# Patient Record
Sex: FEMALE | Race: WHITE | Employment: OTHER | URBAN - METROPOLITAN AREA
[De-identification: names, ages, dates, MRNs, and addresses within clinical notes are randomized per-mention and may not be internally consistent; named-entity substitution may affect disease eponyms.]

---

## 2017-03-10 ENCOUNTER — OFFICE VISIT (OUTPATIENT)
Dept: INTERNAL MEDICINE CLINIC | Age: 62
End: 2017-03-10

## 2017-03-10 VITALS
SYSTOLIC BLOOD PRESSURE: 130 MMHG | HEIGHT: 63 IN | DIASTOLIC BLOOD PRESSURE: 70 MMHG | RESPIRATION RATE: 18 BRPM | TEMPERATURE: 97.8 F | BODY MASS INDEX: 24.59 KG/M2 | WEIGHT: 138.8 LBS | OXYGEN SATURATION: 97 % | HEART RATE: 73 BPM

## 2017-03-10 DIAGNOSIS — K51.30 ULCERATIVE RECTOSIGMOIDITIS WITHOUT COMPLICATION (HCC): ICD-10-CM

## 2017-03-10 DIAGNOSIS — Z13.9 SCREENING: ICD-10-CM

## 2017-03-10 DIAGNOSIS — Z23 NEED FOR ZOSTAVAX ADMINISTRATION: ICD-10-CM

## 2017-03-10 DIAGNOSIS — I10 ESSENTIAL HYPERTENSION: Primary | ICD-10-CM

## 2017-03-10 RX ORDER — LOSARTAN POTASSIUM AND HYDROCHLOROTHIAZIDE 12.5; 5 MG/1; MG/1
1 TABLET ORAL DAILY
Qty: 90 TAB | Refills: 3 | Status: SHIPPED | OUTPATIENT
Start: 2017-03-10 | End: 2018-03-23 | Stop reason: SDUPTHER

## 2017-03-10 NOTE — MR AVS SNAPSHOT
Visit Information Date & Time Provider Department Dept. Phone Encounter #  
 3/10/2017 10:15 AM Rica Kiser MD [facility] [phone] [id number] Follow-up Instructions Return in about 1 year (around 3/10/2018). Follow-up and Disposition History Upcoming Health Maintenance Date Due Hepatitis C Screening 1955 DTaP/Tdap/Td series (1 - Tdap) 6/26/1977 PAP AKA CERVICAL CYTOLOGY 6/26/1977 ZOSTER VACCINE AGE 60> 6/26/2016 INFLUENZA AGE 9 TO ADULT 8/1/2016 BREAST CANCER SCRN MAMMOGRAM 9/21/2018 COLONOSCOPY 11/18/2026 Allergies as of 3/10/2017  Review Complete On: 3/10/2017 By: Doris Walker Severity Noted Reaction Type Reactions Ace Inhibitors  09/08/2010    Cough Current Immunizations  Never Reviewed Name Date Influenza Vaccine 10/22/2014 Not reviewed this visit You Were Diagnosed With   
  
 Codes Comments Essential hypertension    -  Primary ICD-10-CM: I10 
ICD-9-CM: 401.9 Ulcerative rectosigmoiditis without complication ([facility]     JJB-39-OH: K51.30 ICD-9-CM: 556.3 Need for Zostavax administration     ICD-10-CM: S92 ICD-9-CM: V04.89 Screening     ICD-10-CM: Z13.9 ICD-9-CM: V82.9 Vitals BP Pulse Temp Resp Height(growth percentile) Weight(growth percentile) 130/70 (BP 1 Location: Left arm, BP Patient Position: At rest) 73 97.8 °F (36.6 °C) (Oral) 18 5' 3\" (1.6 m) 138 lb 12.8 oz (63 kg) LMP SpO2 BMI OB Status Smoking Status (LMP Unknown) 97% 24.59 kg/m2 Hysterectomy Never Smoker Vitals History BMI and BSA Data Body Mass Index Body Surface Area 24.59 kg/m 2 1.67 m 2 Preferred Pharmacy Pharmacy Name Phone [facility], [address] [phone] Your Updated Medication List  
  
   
This list is accurate as of: 3/10/17 10:51 AM.  Always use your most recent med list.  
  
  
  
  
 CALCIUM PO Take  by mouth. EVAMIST 1.53 mg/spray (1.7%) Spry Generic drug:  Estradiol  
  
 folic acid 1 mg tablet Commonly known as:  Google Take 1 mg by mouth daily. losartan-hydroCHLOROthiazide 50-12.5 mg per tablet Commonly known as:  HYZAAR Take 1 Tab by mouth daily. MULTIVITAMIN PO Take  by mouth.  
  
 sulfaSALAzine 500 mg tablet Commonly known as:  AZULFIDINE Take 500 mg by mouth four (4) times daily. Prescriptions Sent to Pharmacy Refills  
 losartan-hydroCHLOROthiazide (HYZAAR) 50-12.5 mg per tablet 3 Sig: Take 1 Tab by mouth daily. Class: Normal  
 [facility] [address] #: [phone] Route: Oral  
  
We Performed the Following CBC WITH AUTOMATED DIFF [59985 CPT(R)] HEPATITIS C AB [28536 CPT(R)] LIPID PANEL [65339 CPT(R)] METABOLIC PANEL, COMPREHENSIVE [44752 CPT(R)] Follow-up Instructions Return in about 1 year (around 3/10/2018). Introducing [facility]! Dear Harmony Wong: Thank you for requesting a App[employer]net account. Our records indicate that you already have an active [email or web address] account. You can access your account anytime at [email or web address] Did you know that you can access your hospital and ER discharge instructions at any time in [email or web address]? You can also review all of your test results from your hospital stay or ER visit. Additional Information If you have questions, please visit the Frequently Asked Questions section of the [email or web address] website at [email or web address] Remember, [email or web address] is NOT to be used for urgent needs. For medical emergencies, dial 911. Now available from your iPhone and Android! Please provide this summary of care documentation to your next provider. Your primary care clinician is listed as [address] [address] [place] [address] [address]. If you have any questions after today's visit, please call [phone].

## 2017-03-10 NOTE — PROGRESS NOTES
1. Have you been to the ER, urgent care clinic since your last visit? Hospitalized since your last visit? No    2. Have you seen or consulted any other health care providers outside of the [facility] since your last visit? Include any pap smears or colon screening.  No   Chief Complaint   Patient presents with    Medication Refill    Blood Pressure Check     follow up      not fasting

## 2017-03-10 NOTE — PROGRESS NOTES
HISTORY OF PRESENT ILLNESS  Yifan Fox is a 62 y.o. female. HPI  Here for htn. She is well controlled by home readings. Her uc is doing well. She has her c-scope annually. No dysplasia. She is seeing gyn annually. She is active with yard work. She needs labs and vaccines. Past Medical History:   Diagnosis Date    Hypertension     Pyodermic gangrenosum     Ulcerative colitis      Current Outpatient Prescriptions   Medication Sig    losartan-hydroCHLOROthiazide (HYZAAR) 50-12.5 mg per tablet Take 1 Tab by mouth daily.  EVAMIST 1.53 mg/spray (9.9%) Spry     folic acid (FOLVITE) 1 mg tablet Take 1 mg by mouth daily.  MULTIVITAMIN PO Take  by mouth.  CALCIUM PO Take  by mouth.  sulfaSALAzine (AZULFIDINE) 500 mg tablet Take 500 mg by mouth four (4) times daily. No current facility-administered medications for this visit. Review of Systems   All other systems reviewed and are negative. Visit Vitals    /70 (BP 1 Location: Left arm, BP Patient Position: At rest)    Pulse 73    Temp 97.8 °F (36.6 °C) (Oral)    Resp 18    Ht 5' 3\" (1.6 m)    Wt 138 lb 12.8 oz (63 kg)    LMP  (LMP Unknown)    SpO2 97%    BMI 24.59 kg/m2       Physical Exam   Constitutional: She appears well-developed and well-nourished. Cardiovascular: Normal rate, regular rhythm and normal heart sounds. Pulmonary/Chest: Effort normal and breath sounds normal. No respiratory distress. She has no wheezes. She has no rales. Psychiatric: She has a normal mood and affect. Her behavior is normal. Thought content normal.   Nursing note and vitals reviewed. ASSESSMENT and PLAN  Anil Hanson was seen today for medication refill and blood pressure check. Diagnoses and all orders for this visit:    Essential hypertension  -     METABOLIC PANEL, COMPREHENSIVE  -     LIPID PANEL  -     losartan-hydroCHLOROthiazide (HYZAAR) 50-12.5 mg per tablet; Take 1 Tab by mouth daily.   The current medical regimen is effective;  continue present plan and medications. Ulcerative rectosigmoiditis without complication (HCC)  -     CBC WITH AUTOMATED DIFF  The current medical regimen is effective;  continue present plan and medications. Need for Zostavax administration  Rx given.     Screening  -     HEPATITIS C AB    Reviewed plan of care with the patient who has provided input and agrees with the goals

## 2017-03-11 LAB
ALBUMIN SERPL-MCNC: 4.6 G/DL (ref 3.6–4.8)
ALBUMIN/GLOB SERPL: 1.6 {RATIO} (ref 1.1–2.5)
ALP SERPL-CCNC: 61 IU/L (ref 39–117)
ALT SERPL-CCNC: 32 IU/L (ref 0–32)
AST SERPL-CCNC: 33 IU/L (ref 0–40)
BASOPHILS # BLD AUTO: 0.1 X10E3/UL (ref 0–0.2)
BASOPHILS NFR BLD AUTO: 2 %
BILIRUB SERPL-MCNC: 0.6 MG/DL (ref 0–1.2)
BUN SERPL-MCNC: 7 MG/DL (ref 8–27)
BUN/CREAT SERPL: 12 (ref 11–26)
CALCIUM SERPL-MCNC: 9.8 MG/DL (ref 8.7–10.3)
CHLORIDE SERPL-SCNC: 100 MMOL/L (ref 96–106)
CHOLEST SERPL-MCNC: 218 MG/DL (ref 100–199)
CO2 SERPL-SCNC: 28 MMOL/L (ref 18–29)
CREAT SERPL-MCNC: 0.57 MG/DL (ref 0.57–1)
EOSINOPHIL # BLD AUTO: 0 X10E3/UL (ref 0–0.4)
EOSINOPHIL NFR BLD AUTO: 1 %
ERYTHROCYTE [DISTWIDTH] IN BLOOD BY AUTOMATED COUNT: 13 % (ref 12.3–15.4)
GLOBULIN SER CALC-MCNC: 2.8 G/DL (ref 1.5–4.5)
GLUCOSE SERPL-MCNC: 84 MG/DL (ref 65–99)
HCT VFR BLD AUTO: 35.7 % (ref 34–46.6)
HCV AB S/CO SERPL IA: <0.1 S/CO RATIO (ref 0–0.9)
HDLC SERPL-MCNC: 81 MG/DL
HGB BLD-MCNC: 11.5 G/DL (ref 11.1–15.9)
IMM GRANULOCYTES # BLD: 0 X10E3/UL (ref 0–0.1)
IMM GRANULOCYTES NFR BLD: 0 %
INTERPRETATION, 910389: NORMAL
LDLC SERPL CALC-MCNC: 122 MG/DL (ref 0–99)
LYMPHOCYTES # BLD AUTO: 2.1 X10E3/UL (ref 0.7–3.1)
LYMPHOCYTES NFR BLD AUTO: 45 %
MCH RBC QN AUTO: 30.7 PG (ref 26.6–33)
MCHC RBC AUTO-ENTMCNC: 32.2 G/DL (ref 31.5–35.7)
MCV RBC AUTO: 96 FL (ref 79–97)
MONOCYTES # BLD AUTO: 0.4 X10E3/UL (ref 0.1–0.9)
MONOCYTES NFR BLD AUTO: 8 %
NEUTROPHILS # BLD AUTO: 2 X10E3/UL (ref 1.4–7)
NEUTROPHILS NFR BLD AUTO: 44 %
PLATELET # BLD AUTO: 295 X10E3/UL (ref 150–379)
POTASSIUM SERPL-SCNC: 3.9 MMOL/L (ref 3.5–5.2)
PROT SERPL-MCNC: 7.4 G/DL (ref 6–8.5)
RBC # BLD AUTO: 3.74 X10E6/UL (ref 3.77–5.28)
SODIUM SERPL-SCNC: 140 MMOL/L (ref 134–144)
TRIGL SERPL-MCNC: 73 MG/DL (ref 0–149)
VLDLC SERPL CALC-MCNC: 15 MG/DL (ref 5–40)
WBC # BLD AUTO: 4.6 X10E3/UL (ref 3.4–10.8)

## 2017-10-04 ENCOUNTER — HOSPITAL ENCOUNTER (OUTPATIENT)
Dept: MAMMOGRAPHY | Age: 62
Discharge: HOME | End: 2017-10-04
Attending: OBSTETRICS & GYNECOLOGY
Payer: COMMERCIAL

## 2017-10-04 DIAGNOSIS — Z12.31 VISIT FOR SCREENING MAMMOGRAM: ICD-10-CM

## 2017-10-04 PROCEDURE — 77067 SCR MAMMO BI INCL CAD: CPT

## 2018-03-23 ENCOUNTER — OFFICE VISIT (OUTPATIENT)
Dept: INTERNAL MEDICINE CLINIC | Age: 63
End: 2018-03-23

## 2018-03-23 VITALS
BODY MASS INDEX: 25.45 KG/M2 | OXYGEN SATURATION: 98 % | SYSTOLIC BLOOD PRESSURE: 139 MMHG | HEIGHT: 63 IN | RESPIRATION RATE: 18 BRPM | DIASTOLIC BLOOD PRESSURE: 78 MMHG | WEIGHT: 143.6 LBS | HEART RATE: 71 BPM | TEMPERATURE: 97.8 F

## 2018-03-23 DIAGNOSIS — K51.30 ULCERATIVE RECTOSIGMOIDITIS WITHOUT COMPLICATION (HCC): ICD-10-CM

## 2018-03-23 DIAGNOSIS — I10 ESSENTIAL HYPERTENSION: ICD-10-CM

## 2018-03-23 DIAGNOSIS — Z00.00 ROUTINE GENERAL MEDICAL EXAMINATION AT A HEALTH CARE FACILITY: Primary | ICD-10-CM

## 2018-03-23 RX ORDER — LOSARTAN POTASSIUM AND HYDROCHLOROTHIAZIDE 12.5; 5 MG/1; MG/1
1 TABLET ORAL DAILY
Qty: 90 TAB | Refills: 3 | Status: SHIPPED | OUTPATIENT
Start: 2018-03-23 | End: 2019-03-27 | Stop reason: SDUPTHER

## 2018-03-23 NOTE — PROGRESS NOTES
1. Have you been to the ER, urgent care clinic since your last visit? Hospitalized since your last visit? No    2. Have you seen or consulted any other health care providers outside of the [address] [place] [address] since your last visit? Include any pap smears or colon screening. Yes    Chief Complaint   Patient presents with    Complete Physical     Fasting      Chief Complaint   Patient presents with    Complete Physical     she is a 65y.o. year old female who presents for evalution. Reviewed PmHx, RxHx, FmHx, SocHx, AllgHx and updated and dated in the chart. Patient Active Problem List    Diagnosis    UC (ulcerative colitis) ([facility]     2013 colonoscopy negative for inflammation or dysplasia.  Pyoderma gangrenosa    Hypertension       Review of Systems - negative except as listed above in the HPI    Objective:     Vitals:    03/23/18 0915   BP: 139/78   Pulse: 71   Resp: 18   Temp: 97.8 °F (36.6 °C)   TempSrc: Oral   SpO2: 98%   Weight: 143 lb 9.6 oz (65.1 kg)   Height: 5' 3\" (1.6 m)     Physical Examination: General appearance - alert, well appearing, and in no distress  Eyes - pupils equal and reactive, extraocular eye movements intact  Ears - bilateral TM's and external ear canals normal  Nose - normal and patent, no erythema, discharge or polyps  Mouth - mucous membranes moist, pharynx normal without lesions  Neck - supple, no significant adenopathy  Chest - clear to auscultation, no wheezes, rales or rhonchi, symmetric air entry  Heart - normal rate, regular rhythm, normal S1, S2, no murmurs, rubs, clicks or gallops    Assessment/ Plan:   Diagnoses and all orders for this visit:    1. Routine general medical examination at a health care facility  -     losartan-hydroCHLOROthiazide [facility]) 50-12.5 mg per tablet; Take 1 Tab by mouth daily.  -     LIPID PANEL  -     METABOLIC PANEL, COMPREHENSIVE  -     CBC WITH AUTOMATED DIFF  -     TSH 3RD GENERATION    2.  Essential hypertension  - losartan-hydroCHLOROthiazide (HYZAAR) 50-12.5 mg per tablet; Take 1 Tab by mouth daily.  -     LIPID PANEL  -     METABOLIC PANEL, COMPREHENSIVE    3. Ulcerative rectosigmoiditis without complication (HCC)  -     CBC WITH AUTOMATED DIFF       -Patient is in good health  -Discussed with patient cancer risk factors and screens needed  -Colonoscopy was recommended based on current guidelines for screening.  -Labs from previous visits were discussed with patient yes    -Discussed with patient diet and exercise  -Immunizations appropriate for age were discussed with pt and updated  -Follow-up Disposition:  Return in about 1 year (around 3/23/2019). I have discussed the diagnosis with the patient and the intended plan as seen in the above orders. The patient understands and agrees with the plan. The patient has received an after-visit summary and questions were answered concerning future plans. Medication Side Effects and Warnings were discussed with patient  Patient Labs were reviewed and or requested  Patient Past Records were reviewed and or requested     There are no Patient Instructions on file for this visit.         Shahla Sears M.D.

## 2018-03-23 NOTE — MR AVS SNAPSHOT
[address] [place] [address] [address] [facility] 
[phone] Patient: Amor Ludwig MRN: [id number] :1955 Visit Information Date & Time Provider Department Dept. Phone Encounter #  
 3/23/2018  9:00 AM Dorine Boast, MD [facility] [phone] [id number] Follow-up Instructions Return in about 1 year (around 3/23/2019). Follow-up and Disposition History Upcoming Health Maintenance Date Due DTaP/Tdap/Td series (1 - Tdap) 1977 PAP AKA CERVICAL CYTOLOGY 1977 ZOSTER VACCINE AGE 60> 2016 BREAST CANCER SCRN MAMMOGRAM 10/4/2019 COLONOSCOPY 2026 Allergies as of 3/23/2018  Review Complete On: 3/23/2018 By: Dorine Boast, MD  
  
 Severity Noted Reaction Type Reactions Ace Inhibitors  2010    Cough Current Immunizations  Reviewed on 3/23/2018 Name Date Influenza Vaccine 10/22/2014 Zoster Vaccine, Live 3/23/2018 Reviewed by Dorine Boast, MD on 3/23/2018 at  9:35 AM  
You Were Diagnosed With   
  
 Codes Comments Routine general medical examination at a health care facility    -  Primary ICD-10-CM: Z00.00 ICD-9-CM: V70.0 Essential hypertension     ICD-10-CM: I10 
ICD-9-CM: 401.9 Ulcerative rectosigmoiditis without complication ([facility]     RQQ-08-: K51.30 ICD-9-CM: 257. 3 Vitals BP Pulse Temp Resp Height(growth percentile) Weight(growth percentile) 139/78 (BP 1 Location: Right arm, BP Patient Position: At rest) 71 97.8 °F (36.6 °C) (Oral) 18 5' 3\" (1.6 m) 143 lb 9.6 oz (65.1 kg) LMP SpO2 BMI OB Status Smoking Status (LMP Unknown) 98% 25.44 kg/m2 Hysterectomy Never Smoker BMI and BSA Data Body Mass Index Body Surface Area  
 25.44 kg/m 2 1.7 m 2 Preferred Pharmacy Pharmacy Name Phone  [facility], [address] [facility]  [phone] Your Updated Medication List  
  
   
This list is accurate as of 3/23/18  9:39 AM.  Always use your most recent med list.  
  
  
  
  
 CALCIUM PO Take  by mouth. EVAMIST 1.53 mg/spray (1.7%) Spry Generic drug:  Estradiol  
  
 folic acid 1 mg tablet Commonly known as:  Google Take 1 mg by mouth daily. losartan-hydroCHLOROthiazide 50-12.5 mg per tablet Commonly known as:  HYZAAR Take 1 Tab by mouth daily. MULTIVITAMIN PO Take  by mouth.  
  
 sulfaSALAzine 500 mg tablet Commonly known as:  AZULFIDINE Take 500 mg by mouth four (4) times daily. Prescriptions Sent to Pharmacy Refills  
 losartan-hydroCHLOROthiazide (HYZAAR) 50-12.5 mg per tablet 3 Sig: Take 1 Tab by mouth daily. Class: Normal  
 Pharmacy: [facility], [address] #: [phone] Route: Oral  
  
We Performed the Following CBC WITH AUTOMATED DIFF [50909 CPT(R)] LIPID PANEL [07465 CPT(R)] METABOLIC PANEL, COMPREHENSIVE [12794 CPT(R)] TSH 3RD GENERATION [48951 CPT(R)] Follow-up Instructions Return in about 1 year (around 3/23/2019). Introducing hospitals & HEALTH [facility]! Dear Baldo Garsia: Thank you for requesting a [employer] account. Our records indicate that you already have an active [employer] account. You can access your account anytime at [email or web address] Did you know that you can access your hospital and ER discharge instructions at any time in C[employer]? You can also review all of your test results from your hospital stay or ER visit. Additional Information If you have questions, please visit the Frequently Asked Questions section of the [employer] website at [email or web address] Remember, [employer] is NOT to be used for urgent needs. For medical emergencies, dial 911. Now available from your iPhone and Android! Please provide this summary of care documentation to your next provider. Your primary care clinician is listed as [phone] [address] [place] [address] [address]. If you have any questions after today's visit, please call [phone].

## 2018-03-24 LAB
ALBUMIN SERPL-MCNC: 4.4 G/DL (ref 3.6–4.8)
ALBUMIN/GLOB SERPL: 1.5 {RATIO} (ref 1.2–2.2)
ALP SERPL-CCNC: 60 IU/L (ref 39–117)
ALT SERPL-CCNC: 18 IU/L (ref 0–32)
AST SERPL-CCNC: 25 IU/L (ref 0–40)
BASOPHILS # BLD AUTO: 0.1 X10E3/UL (ref 0–0.2)
BASOPHILS NFR BLD AUTO: 1 %
BILIRUB SERPL-MCNC: 0.4 MG/DL (ref 0–1.2)
BUN SERPL-MCNC: 11 MG/DL (ref 8–27)
BUN/CREAT SERPL: 20 (ref 12–28)
CALCIUM SERPL-MCNC: 9.6 MG/DL (ref 8.7–10.3)
CHLORIDE SERPL-SCNC: 100 MMOL/L (ref 96–106)
CHOLEST SERPL-MCNC: 192 MG/DL (ref 100–199)
CO2 SERPL-SCNC: 28 MMOL/L (ref 18–29)
CREAT SERPL-MCNC: 0.54 MG/DL (ref 0.57–1)
EOSINOPHIL # BLD AUTO: 0.1 X10E3/UL (ref 0–0.4)
EOSINOPHIL NFR BLD AUTO: 2 %
ERYTHROCYTE [DISTWIDTH] IN BLOOD BY AUTOMATED COUNT: 14.2 % (ref 12.3–15.4)
GFR SERPLBLD CREATININE-BSD FMLA CKD-EPI: 102 ML/MIN/1.73
GFR SERPLBLD CREATININE-BSD FMLA CKD-EPI: 118 ML/MIN/1.73
GLOBULIN SER CALC-MCNC: 3 G/DL (ref 1.5–4.5)
GLUCOSE SERPL-MCNC: 84 MG/DL (ref 65–99)
HCT VFR BLD AUTO: 34.6 % (ref 34–46.6)
HDLC SERPL-MCNC: 69 MG/DL
HGB BLD-MCNC: 11.5 G/DL (ref 11.1–15.9)
IMM GRANULOCYTES # BLD: 0 X10E3/UL (ref 0–0.1)
IMM GRANULOCYTES NFR BLD: 0 %
INTERPRETATION, 910389: NORMAL
LDLC SERPL CALC-MCNC: 114 MG/DL (ref 0–99)
LYMPHOCYTES # BLD AUTO: 1.9 X10E3/UL (ref 0.7–3.1)
LYMPHOCYTES NFR BLD AUTO: 41 %
MCH RBC QN AUTO: 31.2 PG (ref 26.6–33)
MCHC RBC AUTO-ENTMCNC: 33.2 G/DL (ref 31.5–35.7)
MCV RBC AUTO: 94 FL (ref 79–97)
MONOCYTES # BLD AUTO: 0.5 X10E3/UL (ref 0.1–0.9)
MONOCYTES NFR BLD AUTO: 11 %
NEUTROPHILS # BLD AUTO: 2.1 X10E3/UL (ref 1.4–7)
NEUTROPHILS NFR BLD AUTO: 45 %
PLATELET # BLD AUTO: 303 X10E3/UL (ref 150–379)
POTASSIUM SERPL-SCNC: 4.2 MMOL/L (ref 3.5–5.2)
PROT SERPL-MCNC: 7.4 G/DL (ref 6–8.5)
RBC # BLD AUTO: 3.69 X10E6/UL (ref 3.77–5.28)
SODIUM SERPL-SCNC: 142 MMOL/L (ref 134–144)
TRIGL SERPL-MCNC: 43 MG/DL (ref 0–149)
TSH SERPL DL<=0.005 MIU/L-ACNC: 2.56 UIU/ML (ref 0.45–4.5)
VLDLC SERPL CALC-MCNC: 9 MG/DL (ref 5–40)
WBC # BLD AUTO: 4.7 X10E3/UL (ref 3.4–10.8)

## 2018-04-26 ENCOUNTER — HOSPITAL ENCOUNTER (EMERGENCY)
Age: 63
Discharge: HOME | End: 2018-04-26
Attending: EMERGENCY MEDICINE
Payer: COMMERCIAL

## 2018-04-26 VITALS
RESPIRATION RATE: 16 BRPM | TEMPERATURE: 98 F | SYSTOLIC BLOOD PRESSURE: 140 MMHG | HEART RATE: 78 BPM | HEIGHT: 64 IN | DIASTOLIC BLOOD PRESSURE: 66 MMHG | BODY MASS INDEX: 24.46 KG/M2 | OXYGEN SATURATION: 100 % | WEIGHT: 143.3 LBS

## 2018-04-26 DIAGNOSIS — L02.411 ABSCESS OF AXILLA, RIGHT: Primary | ICD-10-CM

## 2018-04-26 PROCEDURE — 77030018836 HC SOL IRR NACL ICUM -A

## 2018-04-26 PROCEDURE — 74011250637 HC RX REV CODE- 250/637: Performed by: EMERGENCY MEDICINE

## 2018-04-26 PROCEDURE — 75810000289 HC I&D ABSCESS SIMP/COMP/MULT

## 2018-04-26 PROCEDURE — 87077 CULTURE AEROBIC IDENTIFY: CPT | Performed by: EMERGENCY MEDICINE

## 2018-04-26 PROCEDURE — 87186 SC STD MICRODIL/AGAR DIL: CPT | Performed by: EMERGENCY MEDICINE

## 2018-04-26 PROCEDURE — 74011000250 HC RX REV CODE- 250: Performed by: EMERGENCY MEDICINE

## 2018-04-26 PROCEDURE — 99283 EMERGENCY DEPT VISIT LOW MDM: CPT

## 2018-04-26 PROCEDURE — 87205 SMEAR GRAM STAIN: CPT | Performed by: EMERGENCY MEDICINE

## 2018-04-26 RX ORDER — DOXYCYCLINE HYCLATE 100 MG
100 TABLET ORAL
Status: COMPLETED | OUTPATIENT
Start: 2018-04-26 | End: 2018-04-26

## 2018-04-26 RX ORDER — DOXYCYCLINE HYCLATE 100 MG
100 TABLET ORAL 2 TIMES DAILY
Qty: 14 TAB | Refills: 0 | Status: SHIPPED | OUTPATIENT
Start: 2018-04-26 | End: 2018-05-03

## 2018-04-26 RX ORDER — LIDOCAINE HYDROCHLORIDE AND EPINEPHRINE 10; 10 MG/ML; UG/ML
5 INJECTION, SOLUTION INFILTRATION; PERINEURAL ONCE
Status: COMPLETED | OUTPATIENT
Start: 2018-04-26 | End: 2018-04-26

## 2018-04-26 RX ADMIN — DOXYCYCLINE HYCLATE 100 MG: 100 TABLET, COATED ORAL at 19:26

## 2018-04-26 RX ADMIN — LIDOCAINE HYDROCHLORIDE,EPINEPHRINE BITARTRATE 50 MG: 10; .01 INJECTION, SOLUTION INFILTRATION; PERINEURAL at 19:26

## 2018-04-26 NOTE — ED NOTES
The patient was discharged home by Dr Noel Riggins in stable condition. The patient is alert and oriented, in no respiratory distress. The patient's diagnosis, condition and treatment were explained. The patient expressed understanding. A discharge plan has been developed. A  was not involved in the process. Aftercare instructions were given. Pt ambulatory out of the ED.

## 2018-04-26 NOTE — ED NOTES
Patient tolerated medication and procedure well. \"It actually feels better than when I arrived; there is less pressure and it is numb right now. \"

## 2018-04-26 NOTE — DISCHARGE INSTRUCTIONS

## 2018-04-26 NOTE — Clinical Note
- You should have your abscess rechecked in 2 days. 
- Doxycycline as prescribed. - Return to ED for any concerns.

## 2018-04-26 NOTE — ED TRIAGE NOTES
Patient developed a boil in right armpit area 3 days ago and she \"popped it\" last night. Was able to get some \"drainage \" from it. Because of a prior medical problem, is not allowed to use warm compresses on her skin.

## 2018-04-27 NOTE — ED PROVIDER NOTES
Patient is a 65 y.o. female presenting with abscess. The history is provided by the patient. Abscess    This is a new problem. The current episode started more than 2 days ago (3-4 days ago. ). The problem has been rapidly worsening. The problem is associated with nothing. There has been no fever. Affected Location: R axilla. The pain is at a severity of 4/10. The pain is moderate. The pain has been constant since onset. Associated symptoms include hives. Pertinent negatives include no blisters, no itching, no pain and no weeping. Treatments tried: She has tried twice to drain the abscess by squeezing, but the pain and swelling has increased. She has also tried neosporin. This has resulted in increased swelling and streaking. The treatment provided no relief. Past Medical History:   Diagnosis Date    Hypertension     Pyodermic gangrenosum     Ulcerative colitis        Past Surgical History:   Procedure Laterality Date    COLONOSCOPY N/A 11/18/2016    COLONOSCOPY performed by Rachna Hale MD at [facility] ENDOSCOPY    HX COLONOSCOPY  2014    HX HYSTERECTOMY           Family History:   Problem Relation Age of Onset    Hypertension Mother     Cancer Father      lung cancer       Social History     Social History    Marital status:      Spouse name: N/A    Number of children: N/A    Years of education: N/A     Occupational History    Not on file. 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 Other Topics Concern    Not on file     Social History Narrative         ALLERGIES: Ace inhibitors    Review of Systems   Constitutional: Negative for appetite change, chills and fever. HENT: Negative for congestion, nosebleeds and sore throat. Eyes: Negative for pain and discharge.    Respiratory: Negative for cough and shortness of breath. Cardiovascular: Negative for chest pain. Gastrointestinal: Negative for abdominal pain, diarrhea, nausea and vomiting. Genitourinary: Negative for dysuria. Musculoskeletal: Negative. Skin: Positive for color change. Negative for itching and rash. Abscess R axilla. Neurological: Negative for weakness and headaches. Hematological: Negative for adenopathy. Psychiatric/Behavioral: Negative. All other systems reviewed and are negative. Vitals:    04/26/18 1902   BP: 140/66   Pulse: 78   Resp: 16   Temp: 98 °F (36.7 °C)   SpO2: 100%   Weight: 65 kg (143 lb 4.8 oz)   Height: 5' 4\" (1.626 m)            Physical Exam   Constitutional: She is oriented to person, place, and time. She appears well-developed and well-nourished. HENT:   Head: Normocephalic and atraumatic. Right Ear: External ear normal.   Mouth/Throat: Oropharynx is clear and moist.   Eyes: Conjunctivae are normal.   Neck: Normal range of motion. Neck supple. Cardiovascular: Normal rate, regular rhythm and normal heart sounds. Pulmonary/Chest: Effort normal and breath sounds normal.   Abdominal: Soft. Bowel sounds are normal. There is no tenderness. Musculoskeletal: Normal range of motion. She exhibits no edema or tenderness. Neurological: She is alert and oriented to person, place, and time. Skin: Skin is warm and dry. 3 cm x 5 cm abscess anterior axillary line on the right. Mild surrounding erythema. Psychiatric: She has a normal mood and affect. Her behavior is normal.   Nursing note and vitals reviewed.        [facility]      ED Course       I&D Abcess Complex  Performed by: Jeff Chance  Authorized by: Jeff Chance     Consent:     Consent obtained:  Verbal and written    Consent given by:  Patient    Risks discussed:  Bleeding, incomplete drainage, pain, infection and damage to other organs    Alternatives discussed:  Delayed treatment, alternative treatment, observation and referral  Location:     Type:  Abscess    Size:  3 cm x 5 cm. Location:  Upper extremity    Upper extremity location: R axilla. Pre-procedure details:     Skin preparation:  Betadine  Anesthesia (see MAR for exact dosages): Anesthesia method:  Local infiltration    Local anesthetic:  Lidocaine 1% WITH epi  Procedure type:     Complexity:  Complex  Procedure details:     Needle aspiration: no      Incision types:  Single straight    Incision depth:  Dermal    Scalpel blade:  11    Wound management:  Probed and deloculated, irrigated with saline, extensive cleaning and debrided    Drainage:  Purulent    Drainage amount: Moderate    Packing materials:  1/4 in gauze  Post-procedure details:     Patient tolerance of procedure: Tolerated well, no immediate complications    A/P:  1. Abscess - R axilla. S/P I&D. Recheck in 2 days. Mild cellulitis. Treat with Doxycycline. Patient's results have been reviewed with them. Patient and/or family have verbally conveyed their understanding and agreement of the patient's signs, symptoms, diagnosis, treatment and prognosis and additionally agree to follow up as recommended or return to the Emergency Room should their condition change prior to follow-up. Discharge instructions have also been provided to the patient with some educational information regarding their diagnosis as well a list of reasons why they would want to return to the ER prior to their follow-up appointment should their condition change.

## 2018-04-28 LAB
BACTERIA SPEC CULT: ABNORMAL
GRAM STN SPEC: ABNORMAL
SERVICE CMNT-IMP: ABNORMAL

## 2018-04-28 NOTE — PROGRESS NOTES
Attempted to reach patient.  Message left for call back concerning culture result   Pt on doxycycline

## 2018-04-30 NOTE — PROGRESS NOTES
Spoke with patient. She is doing better. Wound improving. No redness, warmth or swelling.  Will complete doxycycyline

## 2018-10-30 ENCOUNTER — HOSPITAL ENCOUNTER (OUTPATIENT)
Dept: MAMMOGRAPHY | Age: 63
Discharge: HOME | End: 2018-10-30
Attending: OBSTETRICS & GYNECOLOGY
Payer: COMMERCIAL

## 2018-10-30 DIAGNOSIS — Z12.31 VISIT FOR SCREENING MAMMOGRAM: ICD-10-CM

## 2018-10-30 PROCEDURE — 77063 BREAST TOMOSYNTHESIS BI: CPT

## 2018-12-03 ENCOUNTER — HOSPITAL ENCOUNTER (OUTPATIENT)
Dept: MAMMOGRAPHY | Age: 63
Discharge: HOME | End: 2018-12-03
Attending: OBSTETRICS & GYNECOLOGY
Payer: COMMERCIAL

## 2018-12-03 DIAGNOSIS — R92.8 ABNORMAL MAMMOGRAM: ICD-10-CM

## 2018-12-03 PROCEDURE — 76642 ULTRASOUND BREAST LIMITED: CPT

## 2018-12-04 ENCOUNTER — HOSPITAL ENCOUNTER (OUTPATIENT)
Age: 63
Setting detail: OUTPATIENT SURGERY
Discharge: HOME | End: 2018-12-04
Attending: INTERNAL MEDICINE | Admitting: INTERNAL MEDICINE
Payer: COMMERCIAL

## 2018-12-04 ENCOUNTER — ANESTHESIA (OUTPATIENT)
Dept: ENDOSCOPY | Age: 63
End: 2018-12-04
Payer: COMMERCIAL

## 2018-12-04 ENCOUNTER — ANESTHESIA EVENT (OUTPATIENT)
Dept: ENDOSCOPY | Age: 63
End: 2018-12-04
Payer: COMMERCIAL

## 2018-12-04 VITALS
HEIGHT: 64 IN | DIASTOLIC BLOOD PRESSURE: 73 MMHG | BODY MASS INDEX: 24.24 KG/M2 | OXYGEN SATURATION: 98 % | TEMPERATURE: 98.6 F | RESPIRATION RATE: 16 BRPM | HEART RATE: 68 BPM | WEIGHT: 142 LBS | SYSTOLIC BLOOD PRESSURE: 132 MMHG

## 2018-12-04 LAB — COLONOSCOPY, EXTERNAL: NORMAL

## 2018-12-04 PROCEDURE — 77030009426 HC FCPS BIOP ENDOSC BSC -B: Performed by: INTERNAL MEDICINE

## 2018-12-04 PROCEDURE — 76060000032 HC ANESTHESIA 0.5 TO 1 HR: Performed by: INTERNAL MEDICINE

## 2018-12-04 PROCEDURE — 74011250636 HC RX REV CODE- 250/636

## 2018-12-04 PROCEDURE — 88305 TISSUE EXAM BY PATHOLOGIST: CPT

## 2018-12-04 PROCEDURE — 76040000007: Performed by: INTERNAL MEDICINE

## 2018-12-04 PROCEDURE — 77030027957 HC TBNG IRR ENDOGTR BUSS -B: Performed by: INTERNAL MEDICINE

## 2018-12-04 PROCEDURE — 74011250637 HC RX REV CODE- 250/637: Performed by: INTERNAL MEDICINE

## 2018-12-04 RX ORDER — LIDOCAINE HYDROCHLORIDE 20 MG/ML
INJECTION, SOLUTION INFILTRATION; PERINEURAL AS NEEDED
Status: DISCONTINUED | OUTPATIENT
Start: 2018-12-04 | End: 2018-12-04 | Stop reason: HOSPADM

## 2018-12-04 RX ORDER — FENTANYL CITRATE 50 UG/ML
200 INJECTION, SOLUTION INTRAMUSCULAR; INTRAVENOUS
Status: DISCONTINUED | OUTPATIENT
Start: 2018-12-04 | End: 2018-12-04 | Stop reason: HOSPADM

## 2018-12-04 RX ORDER — EPINEPHRINE 0.1 MG/ML
1 INJECTION INTRACARDIAC; INTRAVENOUS
Status: DISCONTINUED | OUTPATIENT
Start: 2018-12-04 | End: 2018-12-04 | Stop reason: HOSPADM

## 2018-12-04 RX ORDER — MIDAZOLAM HYDROCHLORIDE 1 MG/ML
.25-5 INJECTION, SOLUTION INTRAMUSCULAR; INTRAVENOUS
Status: DISCONTINUED | OUTPATIENT
Start: 2018-12-04 | End: 2018-12-04 | Stop reason: HOSPADM

## 2018-12-04 RX ORDER — SODIUM CHLORIDE 0.9 % (FLUSH) 0.9 %
5-10 SYRINGE (ML) INJECTION AS NEEDED
Status: DISCONTINUED | OUTPATIENT
Start: 2018-12-04 | End: 2018-12-04 | Stop reason: HOSPADM

## 2018-12-04 RX ORDER — PROPOFOL 10 MG/ML
INJECTION, EMULSION INTRAVENOUS AS NEEDED
Status: DISCONTINUED | OUTPATIENT
Start: 2018-12-04 | End: 2018-12-04 | Stop reason: HOSPADM

## 2018-12-04 RX ORDER — DEXTROMETHORPHAN/PSEUDOEPHED 2.5-7.5/.8
1.2 DROPS ORAL
Status: DISCONTINUED | OUTPATIENT
Start: 2018-12-04 | End: 2018-12-04 | Stop reason: HOSPADM

## 2018-12-04 RX ORDER — SODIUM CHLORIDE 9 MG/ML
INJECTION, SOLUTION INTRAVENOUS
Status: DISCONTINUED | OUTPATIENT
Start: 2018-12-04 | End: 2018-12-04 | Stop reason: HOSPADM

## 2018-12-04 RX ORDER — ASPIRIN 81 MG/1
TABLET ORAL DAILY
COMMUNITY

## 2018-12-04 RX ORDER — ATROPINE SULFATE 0.1 MG/ML
0.5 INJECTION INTRAVENOUS
Status: DISCONTINUED | OUTPATIENT
Start: 2018-12-04 | End: 2018-12-04 | Stop reason: HOSPADM

## 2018-12-04 RX ORDER — SODIUM CHLORIDE 9 MG/ML
150 INJECTION, SOLUTION INTRAVENOUS CONTINUOUS
Status: DISCONTINUED | OUTPATIENT
Start: 2018-12-04 | End: 2018-12-04 | Stop reason: HOSPADM

## 2018-12-04 RX ORDER — SODIUM CHLORIDE 0.9 % (FLUSH) 0.9 %
5-10 SYRINGE (ML) INJECTION EVERY 8 HOURS
Status: DISCONTINUED | OUTPATIENT
Start: 2018-12-04 | End: 2018-12-04 | Stop reason: HOSPADM

## 2018-12-04 RX ADMIN — PROPOFOL 20 MG: 10 INJECTION, EMULSION INTRAVENOUS at 10:37

## 2018-12-04 RX ADMIN — PROPOFOL 30 MG: 10 INJECTION, EMULSION INTRAVENOUS at 10:25

## 2018-12-04 RX ADMIN — PROPOFOL 20 MG: 10 INJECTION, EMULSION INTRAVENOUS at 10:48

## 2018-12-04 RX ADMIN — PROPOFOL 30 MG: 10 INJECTION, EMULSION INTRAVENOUS at 10:26

## 2018-12-04 RX ADMIN — PROPOFOL 20 MG: 10 INJECTION, EMULSION INTRAVENOUS at 10:35

## 2018-12-04 RX ADMIN — PROPOFOL 20 MG: 10 INJECTION, EMULSION INTRAVENOUS at 10:31

## 2018-12-04 RX ADMIN — PROPOFOL 20 MG: 10 INJECTION, EMULSION INTRAVENOUS at 10:43

## 2018-12-04 RX ADMIN — PROPOFOL 50 MG: 10 INJECTION, EMULSION INTRAVENOUS at 10:24

## 2018-12-04 RX ADMIN — PROPOFOL 20 MG: 10 INJECTION, EMULSION INTRAVENOUS at 10:42

## 2018-12-04 RX ADMIN — PROPOFOL 20 MG: 10 INJECTION, EMULSION INTRAVENOUS at 10:36

## 2018-12-04 RX ADMIN — PROPOFOL 20 MG: 10 INJECTION, EMULSION INTRAVENOUS at 10:50

## 2018-12-04 RX ADMIN — PROPOFOL 20 MG: 10 INJECTION, EMULSION INTRAVENOUS at 10:30

## 2018-12-04 RX ADMIN — PROPOFOL 20 MG: 10 INJECTION, EMULSION INTRAVENOUS at 10:40

## 2018-12-04 RX ADMIN — PROPOFOL 20 MG: 10 INJECTION, EMULSION INTRAVENOUS at 10:34

## 2018-12-04 RX ADMIN — PROPOFOL 20 MG: 10 INJECTION, EMULSION INTRAVENOUS at 10:33

## 2018-12-04 RX ADMIN — PROPOFOL 20 MG: 10 INJECTION, EMULSION INTRAVENOUS at 10:39

## 2018-12-04 RX ADMIN — SODIUM CHLORIDE: 9 INJECTION, SOLUTION INTRAVENOUS at 10:23

## 2018-12-04 RX ADMIN — PROPOFOL 20 MG: 10 INJECTION, EMULSION INTRAVENOUS at 10:52

## 2018-12-04 RX ADMIN — PROPOFOL 30 MG: 10 INJECTION, EMULSION INTRAVENOUS at 10:29

## 2018-12-04 RX ADMIN — SODIUM CHLORIDE: 9 INJECTION, SOLUTION INTRAVENOUS at 10:48

## 2018-12-04 RX ADMIN — PROPOFOL 20 MG: 10 INJECTION, EMULSION INTRAVENOUS at 10:32

## 2018-12-04 RX ADMIN — PROPOFOL 20 MG: 10 INJECTION, EMULSION INTRAVENOUS at 10:44

## 2018-12-04 RX ADMIN — LIDOCAINE HYDROCHLORIDE 60 MG: 20 INJECTION, SOLUTION INFILTRATION; PERINEURAL at 10:24

## 2018-12-04 NOTE — H&P
[address] Pre-procedure History and Physical    
 
NAME:  Butch Veras :   1955 MRN:   [id number] CHIEF COMPLAINT/HPI: See procedure note PMH: 
Past Medical History:  
Diagnosis Date  Hypertension  Pyodermic gangrenosum  Ulcerative colitis PSH: 
Past Surgical History:  
Procedure Laterality Date  COLONOSCOPY N/A 2016 COLONOSCOPY performed by Tony Dempsey MD at [facility] HX COLONOSCOPY    HX HYSTERECTOMY Allergies: Allergies Allergen Reactions  Ace Inhibitors Cough Home Medications: 
Prior to Admission Medications Prescriptions Last Dose Informant Patient Reported? Taking? CALCIUM PO 12/3/2018 at Unknown time  Yes Yes Sig: Take  by mouth. EVAMIST 1.53 mg/spray (1.7%) Spry   Yes No  
MULTIVITAMIN PO 12/3/2018 at Unknown time  Yes Yes Sig: Take  by mouth. aspirin delayed-release 81 mg tablet 2018 at Unknown time  Yes Yes Sig: Take  by mouth daily. folic acid (FOLVITE) 1 mg tablet 12/3/2018 at Unknown time  Yes Yes Sig: Take 1 mg by mouth daily. losartan-hydroCHLOROthiazide (HYZAAR) 50-12.5 mg per tablet 2018 at Unknown time  No Yes Sig: Take 1 Tab by mouth daily. sulfaSALAzine (AZULFIDINE) 500 mg tablet 12/3/2018 at Unknown time  Yes Yes Sig: Take 500 mg by mouth two (2) times a day. Facility-Administered Medications: None Hospital Medications: 
Current Facility-Administered Medications Medication Dose Route Frequency  0.9% sodium chloride infusion  150 mL/hr IntraVENous CONTINUOUS  
 sodium chloride (NS) flush 5-10 mL  5-10 mL IntraVENous Q8H  
 sodium chloride (NS) flush 5-10 mL  5-10 mL IntraVENous PRN  
 midazolam (VERSED) injection 0.25-5 mg  0.25-5 mg IntraVENous Multiple  fentaNYL citrate (PF) injection 200 mcg  200 mcg IntraVENous Multiple  simethicone (MYLICON) 80DJ/9.0IV oral drops 80 mg  1.2 mL Oral Multiple  atropine injection 0.5 mg  0.5 mg IntraVENous ONCE PRN  
 EPINEPHrine (ADRENALIN) 0.1 mg/mL syringe 1 mg  1 mg Endoscopically ONCE PRN Family History: 
Family History Problem Relation Age of Onset  Hypertension Mother  Cancer Father   
     lung cancer Social History: 
Social History Tobacco Use  Smoking status: Never Smoker  Smokeless tobacco: Never Used Substance Use Topics  Alcohol use: Yes Comment: rare PHYSICAL EXAM PRIOR TO SEDATION: 
General: Alert, in no acute distress Lungs:            CTA bilaterally Heart:  Normal S1, S2 Abdomen: Soft, Non distended, Non tender. Normoactive bowel sounds. Assessment:  
Stable for sedation administration. Date of last colonoscopy: 2 yrs, Polyps  No   
Plan:  
· Endoscopic procedure with sedation Signed By: Dorothea Zhu MD   
 12/4/2018  10:25 AM

## 2018-12-04 NOTE — PROGRESS NOTES

## 2018-12-04 NOTE — PROCEDURES
Kavya Cueva [facility] 912 Nelly Pina M.D.  [address] [place] [address]  [phone]               Colonoscopy Procedure Note    NAME: Melba Vallejo  :  1955  MRN:  [id number]    Indications:   Ulcerative colitis     : Barrett Garcia MD    Referring Provider:  Wander, MD Mahnaz    Medicines:  MAC anesthesia      Procedure Details:  After informed consent was obtained with all risks and benefits of the procedure explained and preprocedure exam completed, the patient was placed in the left lateral decubitus position. Universal protocol for patient identification was performed and documented in the nursing notes. Throughout the procedure, the patient's blood pressure was monitored at least every five minutes; pulse, and oxygen saturations were monitored continuously. All vital signs were documented in the nursing notes. A digital rectal exam was performed and was normal.  The Olympus videocolonoscope  was inserted in the rectum and carefully advanced to the cecum, which was identified by the ileocecal valve and appendiceal orifice. The colonoscope was slowly withdrawn with careful evaluation between folds. Retroflexion in the rectum was performed; findings and interventions are described below. Procedure start time, extent reached time/cecum time, and procedure end time are documented in the nursing notes. The quality of preparation was adequate.        Findings:   Rectum: mild vascularity change s/p biopsies  Sigmoid: mild vascularity change s/p biopsies  Descending Colon: normal s/p biopsies  Transverse Colon: 2  Sessile polyp(s), the largest 5 mm in size; s/p cold forceps polypectomy; s/p biopsies of the normal appearing mucosa  Ascending Colon: normal s/p biopsies  Cecum: normal s/p biopsies    Interventions:    biopsy of colon colon    Specimens:   ID Type Source Tests Collected by Time Destination   1 : Right Colon Biopsies Rule Out Dysplagia Preservative Gurpreet Jordan MD 12/4/2018 1038 Pathology   2 : Transverse Colon Polyp Preservative   Gurpreet Jordan MD 12/4/2018 1039 Pathology   3 : Transverse Colon Biopsies Rule Out Dysplagia Presvanessaative   Gurpreet Jordan MD 12/4/2018 1043 Pathology   4 : Descending Colon Biopsies Rule Out Dysplagia Presvanessaative   Gurpreet Jordan MD 12/4/2018 1049 Pathology   5 : Recto Sigmoid Biopsies Rule Out Dysplagia Presvanessaative   Gurpreet Jordan MD 12/4/2018 1050 Pathology       EBL:  None. Complications:   No immediate complications     Impression:  -See post-procedure diagnoses. Recommendations:   -Repeat colonoscopy in 2 years   If < 10 years, reason:  above average risk patient    Resume normal medication(s). Follow-up with PCP to make sure UTD with bone mineral density testing and vaccinations. Signed by:  Sydney Luke MD          12/4/2018  11:01 AM

## 2018-12-04 NOTE — ANESTHESIA PREPROCEDURE EVALUATION
Anesthetic History No history of anesthetic complications Review of Systems / Medical History Patient summary reviewed, nursing notes reviewed and pertinent labs reviewed Pulmonary Within defined limits Neuro/Psych Within defined limits Cardiovascular Hypertension Exercise tolerance: >4 METS 
  
GI/Hepatic/Renal 
Within defined limits Comments: uc Endo/Other Within defined limits Other Findings Physical Exam 
 
Airway Mallampati: I 
TM Distance: > 6 cm Neck ROM: normal range of motion Mouth opening: Normal 
 
 Cardiovascular Rhythm: regular Rate: normal 
 
 
 
 Dental 
No notable dental hx Pulmonary Breath sounds clear to auscultation Abdominal 
 
 
 
 Other Findings Anesthetic Plan ASA: 2 Anesthesia type: MAC Induction: Intravenous Anesthetic plan and risks discussed with: Patient

## 2018-12-04 NOTE — ANESTHESIA POSTPROCEDURE EVALUATION
Post-Anesthesia Evaluation and Assessment Patient: Cuca Bain MRN: [id number]  SSN: [id number] YOB: 1955  Age: 59 y.o. Sex: female I have evaluated the patient and they are stable and ready for discharge from the PACU. Cardiovascular Function/Vital Signs Visit Vitals /73 Pulse 68 Temp 37 °C (98.6 °F) Resp 16 Ht 5' 4\" (1.626 m) Wt 64.4 kg (142 lb) SpO2 98% BMI 24.37 kg/m² Patient is status post MAC anesthesia for Procedure(s): 
COLONOSCOPY 
COLON BIOPSY ENDOSCOPIC POLYPECTOMY. Nausea/Vomiting: None Postoperative hydration reviewed and adequate. Pain: 
Pain Scale 1: Numeric (0 - 10) (12/04/18 1144) Pain Intensity 1: 0 (12/04/18 1144) Managed Neurological Status: At baseline Mental Status, Level of Consciousness: Alert and  oriented to person, place, and time Pulmonary Status:  
O2 Device: Room air (12/04/18 1144) Adequate oxygenation and airway patent Complications related to anesthesia: None Post-anesthesia assessment completed. No concerns Signed By: Kasey Solis MD   
 December 4, 2018 Procedure(s): 
COLONOSCOPY 
COLON BIOPSY ENDOSCOPIC POLYPECTOMY. 
 
<BSHSIANPOST> Visit Vitals /73 Pulse 68 Temp 37 °C (98.6 °F) Resp 16 Ht 5' 4\" (1.626 m) Wt 64.4 kg (142 lb) SpO2 98% BMI 24.37 kg/m²

## 2018-12-04 NOTE — PROGRESS NOTES
Farida Short 1955 
[id number] Situation: 
Verbal report received [facility] Procedure: Procedure(s): 
COLONOSCOPY 
COLON BIOPSY Background: 
 
Preoperative diagnosis: ULCERATIVE COLITIS Postoperative diagnosis: 1. Colonic Polyps 2. Ulcerative Colitis :  Dr. Stella Gonsales Assistant(s): Endoscopy Technician-1: Peggy Kirkpatrick Endoscopy RN-1: Yifan Jones RN Specimens:  
ID Type Source Tests Collected by Time Destination 1 : Right Colon Biopsies Rule Out Dysplagia Preservative   Danilo Neff MD 12/4/2018 1038 Pathology 2 : Transverse Colon Polyp Preservative   Danilo Neff MD 12/4/2018 1039 Pathology 3 : Transverse Colon Biopsies Rule Out Dysplagia Presvanessaative   Danilo Neff MD 12/4/2018 1043 Pathology 4 : Descending Colon Biopsies Rule Out Dysplagia Presvanessaative   Danilo Neff MD 12/4/2018 1049 Pathology 5 : Recto Sigmoid Biopsies Rule Out Dysplagia Preservative   Danilo Neff MD 12/4/2018 1050 Pathology H. Pylori  no Assessment: 
Intra-procedure medications Anesthesia gave intra-procedure sedation and medications, see anesthesia flow sheet yes Intravenous fluids: NS@ [facility] Vital signs stable Abdominal assessment: round and soft Recommendation: 
Discharge patient per MD order Return to floor Family or Friend Permission to share finding with family or friend yes

## 2018-12-04 NOTE — DISCHARGE INSTRUCTIONS
Kavya Crook [facility] 916 Nelly Pina M.D.  [address] [place] [address], [address] [place] [address]  [phone]          COLONOSCOPY [id number] [facility]   [id number]  1955    DISCOMFORT:  Redness at IV site- apply warm compress to area; if redness or soreness persist- contact your physician  There may be a slight amount of blood passed from the rectum  Gaseous discomfort- walking, belching will help relieve any discomfort  You may not operate a vehicle for 12 hours  You may not engage in an occupation involving machinery or appliances for the  rest of today  You may not drink alcoholic beverages for at least 12 hours  Avoid making any critical decisions for at least 24 hours    DIET:   You may resume your normal diet, but some patients find that heavy or large  meals may lead to indigestion or vomiting. I suggest a light meal as first food  intake. I recommend a whole food, plant-based diet for your overall health. ACTIVITY:  You may resume your normal daily activities. It is recommended that you spend the remainder of the day resting - avoid any strenuous activity. CALL M.D. IF ANY SIGN OF:   Increasing pain, nausea, vomiting  Abdominal distension (swelling)  Significant bleeding (oral or rectal)  Fever   Pain in chest area  Shortness of breath    Additional Instructions:   Call Dr. Nelly Pina if any questions or problems at [phone]   You should receive the biopsy results by phone or mail within 3 weeks, if not, call  my office for the results      Should have a repeat colonoscopy in 2 years. Colonoscopy showed mild changes in the rectosigmoid area, 2 small polyps removed. Make sure up to date with bone mineral density testing and vaccinations with your PCP.

## 2019-03-27 ENCOUNTER — OFFICE VISIT (OUTPATIENT)
Dept: FAMILY MEDICINE CLINIC | Age: 64
End: 2019-03-27

## 2019-03-27 VITALS
DIASTOLIC BLOOD PRESSURE: 66 MMHG | RESPIRATION RATE: 16 BRPM | BODY MASS INDEX: 24.24 KG/M2 | WEIGHT: 142 LBS | SYSTOLIC BLOOD PRESSURE: 128 MMHG | TEMPERATURE: 98.2 F | HEART RATE: 75 BPM | HEIGHT: 64 IN

## 2019-03-27 DIAGNOSIS — I10 ESSENTIAL HYPERTENSION: Primary | ICD-10-CM

## 2019-03-27 DIAGNOSIS — Z13.220 SCREENING FOR LIPID DISORDERS: ICD-10-CM

## 2019-03-27 DIAGNOSIS — K51.30 ULCERATIVE RECTOSIGMOIDITIS WITHOUT COMPLICATION (HCC): ICD-10-CM

## 2019-03-27 RX ORDER — LOSARTAN POTASSIUM AND HYDROCHLOROTHIAZIDE 12.5; 5 MG/1; MG/1
1 TABLET ORAL DAILY
Qty: 90 TAB | Refills: 3 | Status: SHIPPED | OUTPATIENT
Start: 2019-03-27 | End: 2019-10-04 | Stop reason: RX

## 2019-03-27 NOTE — PROGRESS NOTES
Family Medicine Initial Office Visit Patient: Ludivina Giron 1955, 59 y.o., female Encounter Date: 3/27/2019 ASSESSMENT & PLAN 
  ICD-10-CM ICD-9-CM 1. Essential hypertension I10 401.9 CBC W/O DIFF  
   LIPID PANEL  
   METABOLIC PANEL, COMPREHENSIVE  
   losartan-hydroCHLOROthiazide (HYZAAR) 50-12.5 mg per tablet 2. Screening for lipid disorders Z13.220 V77.91 LIPID PANEL 3. Ulcerative rectosigmoiditis without complication (HCC) R11.43 556.3 Orders Placed This Encounter  CBC W/O DIFF  
 LIPID PANEL  
 METABOLIC PANEL, COMPREHENSIVE  
 losartan-hydroCHLOROthiazide (HYZAAR) 50-12.5 mg per tablet Sig: Take 1 Tab by mouth daily. Dispense:  90 Tab Refill:  3 Overall the patient is doing well, her blood pressure is well controlled and I will refill her medication, she is due for labs as ordered She is up-to-date on colonoscopy and follows with gastroenterology for her ulcerative colitis which does not appear to have any complications at this time I would encourage her to continue to follow a healthy diet, exercise regularly and maintain her healthy weight Follow-up for acute concerns or annually as needed CHIEF COMPLAINT Chief Complaint Patient presents with [facility] Establish Care SUBJECTIVE Ludivina Giron is a 59 y.o. female presenting today for establishing care. Patient works as nurse for clinical utilization review. Patient lives in a house with  and son. Has 2 children 39 and 31. Has adopted grandchildren and is hoping for grandbabies Patient was last seen by primary care about a year ago so Dr An Tobar. Patient last saw a dentist within the last year Patient last had eye exam within the last year Has UC, follows with Dr Collins Vizcarra, having cscopes within the last year. Exercise: \"not as well as I should\" the patient reports 2x weekly goes to [employer] and works out for 30m-1 h and 2 days a week works at home.  Has 2 dogs and walks them but they go very slowly on their walks because the dogs are small and walk slowly. Diet / Weight:yes healthy diet but sometimes does stress eat (popcorn, chocolate, cookies) Tobacco: no 
EtOH: sometimes (rarely) Illicit Substances: none Sexual Activity: one male panter, does not want std testing, S/p hysterectomy with fibroids and hysterectomy 2007, no need for pap smears Follows with gynecology Review of Systems A 12 point review of systems was negative except as noted here or in the HPI. OBJECTIVE Visit Vitals /66 (BP 1 Location: Left arm, BP Patient Position: Sitting) Pulse 75 Temp 98.2 °F (36.8 °C) (Oral) Resp 16 Ht 5' 4\" (1.626 m) Wt 142 lb (64.4 kg) LMP  (LMP Unknown) BMI 24.37 kg/m² Physical Exam  
Constitutional: She is oriented to person, place, and time. She appears well-developed and well-nourished. No distress. NAD, Nontoxic, Appears Stated Age HENT:  
Head: Normocephalic and atraumatic. Mouth/Throat: Oropharynx is clear and moist.  
Eyes: Conjunctivae and EOM are normal. Right eye exhibits no discharge. Left eye exhibits no discharge. No scleral icterus. Neck: Neck supple. Cardiovascular: Normal rate, regular rhythm and normal heart sounds. No murmur heard. Pulmonary/Chest: Effort normal and breath sounds normal. No stridor. No respiratory distress. She has no wheezes. She has no rales. Abdominal: Soft. Bowel sounds are normal. She exhibits no distension. There is no tenderness. Musculoskeletal: She exhibits no edema or tenderness. Neurological: She is alert and oriented to person, place, and time. Grossly intact CN Skin: Skin is warm and dry. No rash noted. She is not diaphoretic. [place] at [place] of L neck, no evidence of infection or irritation Psychiatric: She has a normal mood and affect. Her behavior is normal.  
Nursing note and vitals reviewed. No results found for any visits on 03/27/19. HISTORICAL Reviewed and updated today, and as noted below: 
 
Past Medical History:  
Diagnosis Date  Hypertension  Pyodermic gangrenosum  Ulcerative colitis Past Surgical History:  
Procedure Laterality Date  COLONOSCOPY N/A 11/18/2016 COLONOSCOPY performed by Juno Richardson MD at [facility] ENDOSCOPY  COLONOSCOPY N/A 12/4/2018 COLONOSCOPY performed by Moi Hernandez MD at [address] [place] [address] HX COLONOSCOPY  2014  HX HYSTERECTOMY Family History Problem Relation Age of Onset  Hypertension Mother  Cancer Father   
     lung cancer Social History Tobacco Use Smoking Status Never Smoker Smokeless Tobacco Never Used Social History Socioeconomic History  Marital status:  Spouse name: Not on file  Number of children: Not on file  Years of education: Not on file  Highest education level: Not on file Tobacco Use  Smoking status: Never Smoker  Smokeless tobacco: Never Used Substance and Sexual Activity  Alcohol use: Yes Comment: rare  Sexual activity: Yes  
  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 Allergies Allergen Reactions  Ace Inhibitors Cough No visits with results within 3 Month(s) from this visit. Latest known visit with results is:  
Admission on 04/26/2018, Discharged on 04/26/2018 Component Date Value Ref Range Status  Special Requests: 04/26/2018 NO SPECIAL REQUESTS    Final  
 GRAM STAIN 04/26/2018 3+ WBCS SEEN    Final  
 GRAM STAIN 04/26/2018 FEW GRAM NEGATIVE COCCOBACILLI    Final  
 GRAM STAIN 04/26/2018 FEW GRAM POSITIVE COCCI IN CLUSTERS    Final  
 Culture result: 04/26/2018 LIGHT PROTEUS MIRABILIS*   Final  
 
 
 
Andrew Perales MD 
[address] 03/27/19 10:25 AM 
 
Portions of this note may have been populated using smart dictation software and may have \"sounds-like\" errors present.

## 2019-03-27 NOTE — PROGRESS NOTES
Chief Complaint Patient presents with [address] [place] [address] 1. Have you been to the ER, urgent care clinic since your last visit? Hospitalized since your last visit? No 
 
2. Have you seen or consulted any other health care providers outside of the [address] [place] [address] since your last visit? Include any pap smears or colon screening.  No

## 2019-03-28 LAB
ALBUMIN SERPL-MCNC: 4.6 G/DL (ref 3.6–4.8)
ALBUMIN/GLOB SERPL: 1.5 {RATIO} (ref 1.2–2.2)
ALP SERPL-CCNC: 54 IU/L (ref 39–117)
ALT SERPL-CCNC: 28 IU/L (ref 0–32)
AST SERPL-CCNC: 27 IU/L (ref 0–40)
BILIRUB SERPL-MCNC: 0.7 MG/DL (ref 0–1.2)
BUN SERPL-MCNC: 13 MG/DL (ref 8–27)
BUN/CREAT SERPL: 20 (ref 12–28)
CALCIUM SERPL-MCNC: 9.8 MG/DL (ref 8.7–10.3)
CHLORIDE SERPL-SCNC: 98 MMOL/L (ref 96–106)
CHOLEST SERPL-MCNC: 200 MG/DL (ref 100–199)
CO2 SERPL-SCNC: 27 MMOL/L (ref 20–29)
CREAT SERPL-MCNC: 0.66 MG/DL (ref 0.57–1)
ERYTHROCYTE [DISTWIDTH] IN BLOOD BY AUTOMATED COUNT: 14.9 % (ref 12.3–15.4)
GLOBULIN SER CALC-MCNC: 3 G/DL (ref 1.5–4.5)
GLUCOSE SERPL-MCNC: 87 MG/DL (ref 65–99)
HCT VFR BLD AUTO: 33.6 % (ref 34–46.6)
HDLC SERPL-MCNC: 65 MG/DL
HGB BLD-MCNC: 11 G/DL (ref 11.1–15.9)
INTERPRETATION, 910389: NORMAL
LDLC SERPL CALC-MCNC: 118 MG/DL (ref 0–99)
MCH RBC QN AUTO: 30.7 PG (ref 26.6–33)
MCHC RBC AUTO-ENTMCNC: 32.7 G/DL (ref 31.5–35.7)
MCV RBC AUTO: 94 FL (ref 79–97)
PLATELET # BLD AUTO: 266 X10E3/UL (ref 150–379)
POTASSIUM SERPL-SCNC: 3.6 MMOL/L (ref 3.5–5.2)
PROT SERPL-MCNC: 7.6 G/DL (ref 6–8.5)
RBC # BLD AUTO: 3.58 X10E6/UL (ref 3.77–5.28)
SODIUM SERPL-SCNC: 139 MMOL/L (ref 134–144)
TRIGL SERPL-MCNC: 87 MG/DL (ref 0–149)
VLDLC SERPL CALC-MCNC: 17 MG/DL (ref 5–40)
WBC # BLD AUTO: 5 X10E3/UL (ref 3.4–10.8)

## 2019-03-28 NOTE — PROGRESS NOTES
Electrolytes, kidney function, liver function, blood sugar all normal 
Blood count shows a slight anemia, it appears that the baseline hemoglobin is somewhere between 11 and 11.5, this most recent check is at 11, we can monitor this without intervention at this time, this does not appear to be related to iron deficiency Cholesterol is just Ellen of normal at 200 with a goal of 199 or below. Triglycerides are awfully low and good cholesterol is great at 65.   LDL is just above normal, these labs are stable from last year, no indication to add a cholesterol medication at this time

## 2019-10-03 ENCOUNTER — HOSPITAL ENCOUNTER (OUTPATIENT)
Dept: MAMMOGRAPHY | Age: 64
Discharge: HOME | End: 2019-10-03
Attending: OBSTETRICS & GYNECOLOGY
Payer: COMMERCIAL

## 2019-10-03 DIAGNOSIS — Z12.39 BREAST SCREENING: ICD-10-CM

## 2019-10-03 PROCEDURE — 77063 BREAST TOMOSYNTHESIS BI: CPT

## 2019-10-04 ENCOUNTER — TELEPHONE (OUTPATIENT)
Dept: FAMILY MEDICINE CLINIC | Age: 64
End: 2019-10-04

## 2019-10-04 DIAGNOSIS — I10 ESSENTIAL HYPERTENSION: ICD-10-CM

## 2019-10-04 RX ORDER — HYDROCHLOROTHIAZIDE 12.5 MG/1
12.5 TABLET ORAL DAILY
Qty: 90 TAB | Refills: 1 | Status: SHIPPED | OUTPATIENT
Start: 2019-10-04 | End: 2020-04-16 | Stop reason: DRUGHIGH

## 2019-10-04 RX ORDER — LOSARTAN POTASSIUM 50 MG/1
50 TABLET ORAL DAILY
Qty: 90 TAB | Refills: 1 | Status: SHIPPED | OUTPATIENT
Start: 2019-10-04 | End: 2020-03-02

## 2019-10-04 NOTE — TELEPHONE ENCOUNTER
Please inform the patient that her medication is on back order and so I have sent losartan and hydrochlorothiazide, the components of her pill but separate so she will need to take 2 pills daily instead of 1. She may take these together as they are usually combined in 1 pill for her.

## 2019-10-04 NOTE — TELEPHONE ENCOUNTER
1 To [facility] faxed request for an alternative for losartan-HCTZ 50-12.5mg. States on backorder.     Suggested 100/25

## 2019-10-07 NOTE — TELEPHONE ENCOUNTER
Pt returned call, was advised of medication change, states she's already picked it up from the pharmacy and agrees to plan.  Romi

## 2020-04-06 ENCOUNTER — TELEPHONE (OUTPATIENT)
Dept: FAMILY MEDICINE CLINIC | Age: 65
End: 2020-04-06

## 2020-04-06 RX ORDER — LOSARTAN POTASSIUM 100 MG/1
50 TABLET ORAL DAILY
Qty: 45 TAB | Refills: 1 | Status: SHIPPED | OUTPATIENT
Start: 2020-04-06 | End: 2020-04-16 | Stop reason: DRUGHIGH

## 2020-04-16 ENCOUNTER — PATIENT MESSAGE (OUTPATIENT)
Dept: FAMILY MEDICINE CLINIC | Age: 65
End: 2020-04-16

## 2020-04-16 RX ORDER — VALSARTAN AND HYDROCHLOROTHIAZIDE 160; 12.5 MG/1; MG/1
1 TABLET, FILM COATED ORAL DAILY
Qty: 90 TAB | Refills: 1 | Status: SHIPPED | OUTPATIENT
Start: 2020-04-16 | End: 2020-07-24 | Stop reason: DRUGHIGH

## 2020-04-16 NOTE — TELEPHONE ENCOUNTER
From: Mary Sandy  To: Markie Oneal MD  Sent: 4/16/2020 9:20 AM EDT  Subject: Prescription Question    I am presently on Fetldjebs152eq/HCTZ 12.5mg. The medication used to be 1 tab with both meds in the 1 tab. The med. Has evolved to 1 tab of HCTZ and 1 tab Lorsartan. I am assuming due to the issues with Lorsartan the pharmacy sometimes will reduce number pills filled at a time requiring multiple visits to the pharmacy to  the meds. I am really tired of the multiple visits. I would really like to switch to a different med to stop that. Isn't Benicar a similar med? Maybe I could switch to Benicar or it's generic ? B/p is running 122/75.

## 2020-07-24 ENCOUNTER — OFFICE VISIT (OUTPATIENT)
Dept: FAMILY MEDICINE CLINIC | Age: 65
End: 2020-07-24

## 2020-07-24 VITALS
RESPIRATION RATE: 20 BRPM | HEART RATE: 83 BPM | BODY MASS INDEX: 24.24 KG/M2 | OXYGEN SATURATION: 98 % | TEMPERATURE: 98 F | SYSTOLIC BLOOD PRESSURE: 132 MMHG | DIASTOLIC BLOOD PRESSURE: 69 MMHG | WEIGHT: 142 LBS | HEIGHT: 64 IN

## 2020-07-24 DIAGNOSIS — Z13.220 SCREENING FOR LIPID DISORDERS: ICD-10-CM

## 2020-07-24 DIAGNOSIS — I73.9 PERIPHERAL VASCULAR DISEASE (HCC): ICD-10-CM

## 2020-07-24 DIAGNOSIS — Z00.00 WELL WOMAN EXAM (NO GYNECOLOGICAL EXAM): Primary | ICD-10-CM

## 2020-07-24 DIAGNOSIS — I10 ESSENTIAL HYPERTENSION: ICD-10-CM

## 2020-07-24 DIAGNOSIS — K51.30 ULCERATIVE RECTOSIGMOIDITIS WITHOUT COMPLICATION (HCC): ICD-10-CM

## 2020-07-24 RX ORDER — VALSARTAN AND HYDROCHLOROTHIAZIDE 80; 12.5 MG/1; MG/1
1 TABLET, FILM COATED ORAL DAILY
Qty: 90 TAB | Refills: 1 | Status: SHIPPED | OUTPATIENT
Start: 2020-07-24 | End: 2021-03-03 | Stop reason: SDUPTHER

## 2020-07-24 RX ORDER — TETANUS TOXOID, REDUCED DIPHTHERIA TOXOID AND ACELLULAR PERTUSSIS VACCINE, ADSORBED 5; 2.5; 8; 8; 2.5 [IU]/.5ML; [IU]/.5ML; UG/.5ML; UG/.5ML; UG/.5ML
0.5 SUSPENSION INTRAMUSCULAR ONCE
Qty: 0.5 ML | Refills: 0 | Status: SHIPPED | OUTPATIENT
Start: 2020-07-24 | End: 2020-07-24

## 2020-07-24 NOTE — PROGRESS NOTES
Subjective:   60 y.o. female for Well Woman Check. Her gyne and breast care is done elsewhere by her Ob-Gyne physician. She is s/p hysterectomy    Past Medical History:   Diagnosis Date    Hypertension     Pyodermic gangrenosum     Ulcerative colitis      Past Surgical History:   Procedure Laterality Date    COLONOSCOPY N/A 11/18/2016    COLONOSCOPY performed by Rigo Meyer MD at [facility] ENDOSCOPY    COLONOSCOPY N/A 12/4/2018    COLONOSCOPY performed by Kimberli Brooks MD at [facility] ENDOSCOPY    HX COLONOSCOPY  2014    HX HYSTERECTOMY       Family History   Problem Relation Age of Onset    Hypertension Mother     Cancer Father         lung cancer     Social History     Tobacco Use    Smoking status: Never Smoker    Smokeless tobacco: Never Used   Substance Use Topics    Alcohol use: Yes     Comment: rare        Lab Results   Component Value Date/Time    WBC 5.0 03/27/2019 11:18 AM    HGB 11.0 (L) 03/27/2019 11:18 AM    HCT 33.6 (L) 03/27/2019 11:18 AM    PLATELET 361 74/57/4847 11:18 AM    MCV 94 03/27/2019 11:18 AM     Lab Results   Component Value Date/Time    Cholesterol, total 200 (H) 03/27/2019 11:18 AM    HDL Cholesterol 65 03/27/2019 11:18 AM    LDL, calculated 118 (H) 03/27/2019 11:18 AM    Triglyceride 87 03/27/2019 11:18 AM     Lab Results   Component Value Date/Time    Sodium 139 03/27/2019 11:18 AM    Potassium 3.6 03/27/2019 11:18 AM    Chloride 98 03/27/2019 11:18 AM    CO2 27 03/27/2019 11:18 AM    Anion gap 8 09/08/2010 11:06 AM    Glucose 87 03/27/2019 11:18 AM    BUN 13 03/27/2019 11:18 AM    Creatinine 0.66 03/27/2019 11:18 AM    BUN/Creatinine ratio 20 03/27/2019 11:18 AM    GFR est  03/27/2019 11:18 AM    GFR est non-AA 95 03/27/2019 11:18 AM    Calcium 9.8 03/27/2019 11:18 AM    Bilirubin, total 0.7 03/27/2019 11:18 AM    ALT (SGPT) 28 03/27/2019 11:18 AM    Alk.  phosphatase 54 03/27/2019 11:18 AM    Protein, total 7.6 03/27/2019 11:18 AM    Albumin 4.6 03/27/2019 11:18 AM Globulin 3.7 09/08/2010 11:06 AM    A-G Ratio 1.5 03/27/2019 11:18 AM         Specific concerns today: doing great overall. UC is being managed by her gastroenterologist, as long as she stays compliant with diet/medication she is doing well. No rectal bleeding or active flare ups right now. She knows her GI would be interested in her Liver Function tests    HTN: patient reports stable on medications. No chest pain, sob, headache, blurred vision, leg swelling, diaphoresis, falls. Compliant with medications as prescribed. IS checking blood pressures at home and reports range is 110-120/60s. No significant hyper or hypotensive episodes that the patient reports today. N.B: pt has been cutting her diovan hct in half for about a month now, she was very thirsty when starting in. Her bp has stayed well controlled and her thirst is better. She wonders if this is OK. (of course it is, which I shared with her)    PVD: she has known disease, she's doing well, sometimes her L leg is aching, it is there when she thinks about it but not there all the time, no swelling, no injury, no discoloration, no significant varicosities    Knows she's due for lipid screening    Wt Readings from Last 3 Encounters:   07/24/20 142 lb (64.4 kg)   03/27/19 142 lb (64.4 kg)   12/04/18 142 lb (64.4 kg)             Review of Systems  A 12 point review of systems was negative except as noted here or in the HPI. Objective:   Blood pressure 132/69, pulse 83, temperature 98 °F (36.7 °C), temperature source Oral, resp. rate 20, height 5' 4\" (1.626 m), weight 142 lb (64.4 kg), SpO2 98 %. Physical Examination:   Physical Exam  Vitals signs and nursing note reviewed. Constitutional:       General: She is not in acute distress. Appearance: Normal appearance. She is well-developed. She is not diaphoretic. HENT:      Head: Normocephalic and atraumatic.       Right Ear: External ear normal.      Left Ear: External ear normal.      Nose: Nose normal. No congestion. Mouth/Throat:      Mouth: Mucous membranes are moist.      Pharynx: Oropharynx is clear. No oropharyngeal exudate or posterior oropharyngeal erythema. Eyes:      General: No scleral icterus. Right eye: No discharge. Left eye: No discharge. Extraocular Movements: Extraocular movements intact. Conjunctiva/sclera: Conjunctivae normal.      Pupils: Pupils are equal, round, and reactive to light. Neck:      Musculoskeletal: Normal range of motion and neck supple. Vascular: No carotid bruit. Cardiovascular:      Rate and Rhythm: Normal rate and regular rhythm. Heart sounds: Normal heart sounds. No murmur. No friction rub. No gallop. Pulmonary:      Effort: Pulmonary effort is normal. No respiratory distress. Breath sounds: Normal breath sounds. No stridor. No wheezing, rhonchi or rales. Abdominal:      General: Bowel sounds are normal. There is no distension. Palpations: Abdomen is soft. Tenderness: There is no abdominal tenderness. Musculoskeletal: Normal range of motion. General: No tenderness. Right lower leg: No edema. Left lower leg: No edema. Lymphadenopathy:      Cervical: No cervical adenopathy. Skin:     General: Skin is warm and dry. Capillary Refill: Capillary refill takes less than 2 seconds. Findings: No rash. Comments: Well healed surgical repair at the back of her L heal and at the L forearm   Neurological:      General: No focal deficit present. Mental Status: She is alert and oriented to person, place, and time. Coordination: Coordination normal.      Gait: Gait normal.   Psychiatric:         Mood and Affect: Mood normal.         Behavior: Behavior normal.         Thought Content: Thought content normal.         Judgment: Judgment normal.           Assessment/Plan:   1.  Well woman exam (no gynecological exam)  No more pap, hx of hyster w/ cervix removed  utd on HM except tdap which was ordered  Encouraged on healthy lifestyle    2. Essential hypertension  Can decrease diovan hct and see if that does better--let me know in a month by message how your pressures are, or sooner if needed  Fasting labs  - CBC W/O DIFF; Future  - METABOLIC PANEL, COMPREHENSIVE; Future  - LIPID PANEL; Future    3. Ulcerative rectosigmoiditis without complication ([facility]  Stable, will need her labs available to her GI doc as well  - CBC W/O DIFF; Future  - METABOLIC PANEL, COMPREHENSIVE; Future    4. Peripheral vascular disease ([facility]  Stable with adequate symptom control and no apparent ulcerations   - LIPID PANEL; Future    5. Screening for lipid disorders  Fasting labs  - METABOLIC PANEL, COMPREHENSIVE; Future  - LIPID PANEL;  Future    increase physical activity, bring BP log to office visit, continue present diet with no restrictions, routine labs ordered, call if any problems  lab results and schedule of future lab studies reviewed with patient  reviewed diet, exercise and weight control  reviewed medications and side effects in detail  radiology results and schedule of future radiology studies reviewed with patient

## 2020-07-24 NOTE — PROGRESS NOTES
Chief Complaint   Patient presents with    Well Woman    Leg Pain     left leg- pt states she does set alot at work

## 2020-07-25 LAB
ALBUMIN SERPL-MCNC: 4.6 G/DL (ref 3.8–4.8)
ALBUMIN/GLOB SERPL: 1.5 {RATIO} (ref 1.2–2.2)
ALP SERPL-CCNC: 69 IU/L (ref 39–117)
ALT SERPL-CCNC: 17 IU/L (ref 0–32)
AST SERPL-CCNC: 22 IU/L (ref 0–40)
BILIRUB SERPL-MCNC: 0.5 MG/DL (ref 0–1.2)
BUN SERPL-MCNC: 9 MG/DL (ref 8–27)
BUN/CREAT SERPL: 14 (ref 12–28)
CALCIUM SERPL-MCNC: 9.6 MG/DL (ref 8.7–10.3)
CHLORIDE SERPL-SCNC: 102 MMOL/L (ref 96–106)
CHOLEST SERPL-MCNC: 193 MG/DL (ref 100–199)
CO2 SERPL-SCNC: 24 MMOL/L (ref 20–29)
CREAT SERPL-MCNC: 0.64 MG/DL (ref 0.57–1)
ERYTHROCYTE [DISTWIDTH] IN BLOOD BY AUTOMATED COUNT: 12.8 % (ref 11.7–15.4)
GLOBULIN SER CALC-MCNC: 3 G/DL (ref 1.5–4.5)
GLUCOSE SERPL-MCNC: 84 MG/DL (ref 65–99)
HCT VFR BLD AUTO: 33.7 % (ref 34–46.6)
HDLC SERPL-MCNC: 58 MG/DL
HGB BLD-MCNC: 11.1 G/DL (ref 11.1–15.9)
INTERPRETATION, 910389: NORMAL
LDLC SERPL CALC-MCNC: 120 MG/DL (ref 0–99)
MCH RBC QN AUTO: 30.5 PG (ref 26.6–33)
MCHC RBC AUTO-ENTMCNC: 32.9 G/DL (ref 31.5–35.7)
MCV RBC AUTO: 93 FL (ref 79–97)
PLATELET # BLD AUTO: 236 X10E3/UL (ref 150–450)
POTASSIUM SERPL-SCNC: 4.2 MMOL/L (ref 3.5–5.2)
PROT SERPL-MCNC: 7.6 G/DL (ref 6–8.5)
RBC # BLD AUTO: 3.64 X10E6/UL (ref 3.77–5.28)
SODIUM SERPL-SCNC: 141 MMOL/L (ref 134–144)
TRIGL SERPL-MCNC: 77 MG/DL (ref 0–149)
VLDLC SERPL CALC-MCNC: 15 MG/DL (ref 5–40)
WBC # BLD AUTO: 4.5 X10E3/UL (ref 3.4–10.8)

## 2020-11-03 ENCOUNTER — TRANSCRIBE ORDER (OUTPATIENT)
Dept: SCHEDULING | Age: 65
End: 2020-11-03

## 2020-11-03 DIAGNOSIS — Z12.31 BREAST CANCER SCREENING BY MAMMOGRAM: Primary | ICD-10-CM

## 2020-12-01 ENCOUNTER — HOSPITAL ENCOUNTER (OUTPATIENT)
Dept: MAMMOGRAPHY | Age: 65
Discharge: HOME | End: 2020-12-01
Attending: OBSTETRICS & GYNECOLOGY
Payer: COMMERCIAL

## 2020-12-01 DIAGNOSIS — Z12.31 BREAST CANCER SCREENING BY MAMMOGRAM: ICD-10-CM

## 2020-12-01 PROCEDURE — 77063 BREAST TOMOSYNTHESIS BI: CPT

## 2021-01-18 ENCOUNTER — HOSPITAL ENCOUNTER (OUTPATIENT)
Dept: PREADMISSION TESTING | Age: 66
Discharge: HOME | End: 2021-01-18
Admitting: INTERNAL MEDICINE
Payer: COMMERCIAL

## 2021-01-18 ENCOUNTER — TRANSCRIBE ORDER (OUTPATIENT)
Dept: REGISTRATION | Age: 66
End: 2021-01-18

## 2021-01-18 DIAGNOSIS — Z01.812 PRE-PROCEDURE LAB EXAM: Primary | ICD-10-CM

## 2021-01-18 DIAGNOSIS — Z01.812 PRE-PROCEDURE LAB EXAM: ICD-10-CM

## 2021-01-18 PROCEDURE — U0003 INFECTIOUS AGENT DETECTION BY NUCLEIC ACID (DNA OR RNA); SEVERE ACUTE RESPIRATORY SYNDROME CORONAVIRUS 2 (SARS-COV-2) (CORONAVIRUS DISEASE [COVID-19]), AMPLIFIED PROBE TECHNIQUE, MAKING USE OF HIGH THROUGHPUT TECHNOLOGIES AS DESCRIBED BY CMS-2020-01-R: HCPCS

## 2021-01-19 LAB — SARS-COV-2, COV2NT: NOT DETECTED

## 2021-01-22 ENCOUNTER — ANESTHESIA EVENT (OUTPATIENT)
Dept: ENDOSCOPY | Age: 66
End: 2021-01-22
Payer: COMMERCIAL

## 2021-01-22 ENCOUNTER — HOSPITAL ENCOUNTER (OUTPATIENT)
Age: 66
Setting detail: OUTPATIENT SURGERY
Discharge: HOME | End: 2021-01-22
Attending: INTERNAL MEDICINE | Admitting: INTERNAL MEDICINE
Payer: COMMERCIAL

## 2021-01-22 ENCOUNTER — ANESTHESIA (OUTPATIENT)
Dept: ENDOSCOPY | Age: 66
End: 2021-01-22
Payer: COMMERCIAL

## 2021-01-22 VITALS
TEMPERATURE: 98 F | DIASTOLIC BLOOD PRESSURE: 72 MMHG | OXYGEN SATURATION: 100 % | HEIGHT: 64 IN | SYSTOLIC BLOOD PRESSURE: 132 MMHG | RESPIRATION RATE: 30 BRPM | HEART RATE: 79 BPM | WEIGHT: 147.06 LBS | BODY MASS INDEX: 25.11 KG/M2

## 2021-01-22 PROCEDURE — 74011250636 HC RX REV CODE- 250/636: Performed by: INTERNAL MEDICINE

## 2021-01-22 PROCEDURE — 77030029384 HC SNR POLYP CAPTVR II BSC -B: Performed by: INTERNAL MEDICINE

## 2021-01-22 PROCEDURE — 76040000019: Performed by: INTERNAL MEDICINE

## 2021-01-22 PROCEDURE — 2709999900 HC NON-CHARGEABLE SUPPLY: Performed by: INTERNAL MEDICINE

## 2021-01-22 PROCEDURE — 77030021593 HC FCPS BIOP ENDOSC BSC -A: Performed by: INTERNAL MEDICINE

## 2021-01-22 PROCEDURE — 74011250636 HC RX REV CODE- 250/636: Performed by: NURSE ANESTHETIST, CERTIFIED REGISTERED

## 2021-01-22 PROCEDURE — 74011000250 HC RX REV CODE- 250: Performed by: NURSE ANESTHETIST, CERTIFIED REGISTERED

## 2021-01-22 PROCEDURE — 88305 TISSUE EXAM BY PATHOLOGIST: CPT

## 2021-01-22 PROCEDURE — 76060000031 HC ANESTHESIA FIRST 0.5 HR: Performed by: INTERNAL MEDICINE

## 2021-01-22 RX ORDER — SODIUM CHLORIDE 9 MG/ML
INJECTION, SOLUTION INTRAVENOUS
Status: DISCONTINUED | OUTPATIENT
Start: 2021-01-22 | End: 2021-01-22 | Stop reason: HOSPADM

## 2021-01-22 RX ORDER — SODIUM CHLORIDE 0.9 % (FLUSH) 0.9 %
5-40 SYRINGE (ML) INJECTION AS NEEDED
Status: DISCONTINUED | OUTPATIENT
Start: 2021-01-22 | End: 2021-01-22 | Stop reason: HOSPADM

## 2021-01-22 RX ORDER — ATROPINE SULFATE 0.1 MG/ML
0.5 INJECTION INTRAVENOUS
Status: DISCONTINUED | OUTPATIENT
Start: 2021-01-22 | End: 2021-01-22 | Stop reason: HOSPADM

## 2021-01-22 RX ORDER — DEXTROMETHORPHAN/PSEUDOEPHED 2.5-7.5/.8
1.2 DROPS ORAL
Status: DISCONTINUED | OUTPATIENT
Start: 2021-01-22 | End: 2021-01-22 | Stop reason: HOSPADM

## 2021-01-22 RX ORDER — LIDOCAINE HYDROCHLORIDE 20 MG/ML
INJECTION, SOLUTION EPIDURAL; INFILTRATION; INTRACAUDAL; PERINEURAL AS NEEDED
Status: DISCONTINUED | OUTPATIENT
Start: 2021-01-22 | End: 2021-01-22 | Stop reason: HOSPADM

## 2021-01-22 RX ORDER — PROPOFOL 10 MG/ML
INJECTION, EMULSION INTRAVENOUS AS NEEDED
Status: DISCONTINUED | OUTPATIENT
Start: 2021-01-22 | End: 2021-01-22 | Stop reason: HOSPADM

## 2021-01-22 RX ORDER — MIDAZOLAM HYDROCHLORIDE 1 MG/ML
.25-5 INJECTION, SOLUTION INTRAMUSCULAR; INTRAVENOUS
Status: DISCONTINUED | OUTPATIENT
Start: 2021-01-22 | End: 2021-01-22 | Stop reason: HOSPADM

## 2021-01-22 RX ORDER — EPINEPHRINE 0.1 MG/ML
1 INJECTION INTRACARDIAC; INTRAVENOUS
Status: DISCONTINUED | OUTPATIENT
Start: 2021-01-22 | End: 2021-01-22 | Stop reason: HOSPADM

## 2021-01-22 RX ORDER — SODIUM CHLORIDE 9 MG/ML
150 INJECTION, SOLUTION INTRAVENOUS CONTINUOUS
Status: DISCONTINUED | OUTPATIENT
Start: 2021-01-22 | End: 2021-01-22 | Stop reason: HOSPADM

## 2021-01-22 RX ORDER — SODIUM CHLORIDE 0.9 % (FLUSH) 0.9 %
5-40 SYRINGE (ML) INJECTION EVERY 8 HOURS
Status: DISCONTINUED | OUTPATIENT
Start: 2021-01-22 | End: 2021-01-22 | Stop reason: HOSPADM

## 2021-01-22 RX ORDER — FENTANYL CITRATE 50 UG/ML
200 INJECTION, SOLUTION INTRAMUSCULAR; INTRAVENOUS
Status: DISCONTINUED | OUTPATIENT
Start: 2021-01-22 | End: 2021-01-22 | Stop reason: HOSPADM

## 2021-01-22 RX ADMIN — PROPOFOL 50 MG: 10 INJECTION, EMULSION INTRAVENOUS at 13:30

## 2021-01-22 RX ADMIN — PROPOFOL 50 MG: 10 INJECTION, EMULSION INTRAVENOUS at 13:12

## 2021-01-22 RX ADMIN — LIDOCAINE HYDROCHLORIDE 50 MG: 20 INJECTION, SOLUTION EPIDURAL; INFILTRATION; INTRACAUDAL; PERINEURAL at 13:12

## 2021-01-22 RX ADMIN — PROPOFOL 50 MG: 10 INJECTION, EMULSION INTRAVENOUS at 13:21

## 2021-01-22 RX ADMIN — PROPOFOL 50 MG: 10 INJECTION, EMULSION INTRAVENOUS at 13:15

## 2021-01-22 RX ADMIN — PROPOFOL 50 MG: 10 INJECTION, EMULSION INTRAVENOUS at 13:33

## 2021-01-22 RX ADMIN — SODIUM CHLORIDE: 900 INJECTION, SOLUTION INTRAVENOUS at 12:42

## 2021-01-22 RX ADMIN — PROPOFOL 50 MG: 10 INJECTION, EMULSION INTRAVENOUS at 13:27

## 2021-01-22 RX ADMIN — PROPOFOL 50 MG: 10 INJECTION, EMULSION INTRAVENOUS at 13:24

## 2021-01-22 RX ADMIN — PROPOFOL 50 MG: 10 INJECTION, EMULSION INTRAVENOUS at 13:18

## 2021-01-22 NOTE — PROCEDURES
[facility] [address] Jerry Duque M.D.  Alexandra Hodges, [address]  [phone]               Colonoscopy Procedure Note    NAME: Douglas Owens  :  1955  MRN:  [id number]    Indications:   Ulcerative colitis     : Simone Todd MD    Referring Provider:  Lucio Andrade MD    Staff: Endoscopy Oly Beck: Dennie Pedro C  Endoscopy RN-1: Berton Goltz, RN    Prosthetic devices, grafts, tissues, transplant, or devices implanted: none    Medicines:  MAC anesthesia      Procedure Details:  After informed consent was obtained with all risks and benefits of the procedure explained and preprocedure exam completed, the patient was placed in the left lateral decubitus position. Universal protocol for patient identification was performed and documented in the nursing notes. Throughout the procedure, the patient's blood pressure was monitored at least every five minutes; pulse, and oxygen saturations were monitored continuously. All vital signs were documented in the nursing notes. A digital rectal exam was performed and was normal.  The Olympus videocolonoscope  was inserted in the rectum and carefully advanced to the cecum, which was identified by the ileocecal valve and appendiceal orifice. The colonoscope was slowly withdrawn with careful evaluation between folds. Retroflexion in the rectum was performed; findings and interventions are described below. Procedure start time, extent reached time/cecum time, and procedure end time are documented in the nursing notes. The quality of preparation was good. Findings:   1. 5 mm sessile polyp in the cecum s/p cold snare polypectomy  2. Trivial patchy colitis in the rectosigmoid area, rest of the colon was normal aside from a few scars. Surveillance protocol biopsies taken throughout the colon. Interventions:    1 complete polypectomy were performed using cold biopsy forceps and the polyps were  retrieved. bxs throughout the colon. Specimens:   ID Type Source Tests Collected by Time Destination   1 : pathology Preservative Cecum  Harley Feliciano MD 1/22/2021 1322 Pathology   2 : right colon Preservative   Harley Feliciano MD 1/22/2021 1325 Pathology   3 : pathology Preservative Colon, Transverse  Harley Feliciano MD 1/22/2021 1328 Pathology   4 : pathology Preservative Colon, Descending  Harley Feliciano MD 1/22/2021 1330 Pathology   5 : recto-sigmoid Preservative   Harley Feliciano MD 1/22/2021 1332 Pathology       EBL:  None. Complications:   No immediate complications     Impression:  -See post-procedure diagnoses. Recommendations:   -Await pathology. Recommendation for next colonscopy in 2 years  If < 10 years, reason:  above average risk patient    Resume normal medication(s). Signed by:  Darrell Favre, MD          1/22/2021  1:43 PM

## 2021-01-22 NOTE — ANESTHESIA POSTPROCEDURE EVALUATION
Procedure(s):  COLONOSCOPY  :-  COLON BIOPSY  ENDOSCOPIC POLYPECTOMY. MAC    <BSHSIANPOST>    INITIAL Post-op Vital signs:   Vitals Value Taken Time   /68 01/22/21 1341   Temp 36.7 °C (98 °F) 01/22/21 1341   Pulse 80 01/22/21 1344   Resp 17 01/22/21 1344   SpO2 99 % 01/22/21 1344   Vitals shown include unvalidated device data.

## 2021-01-22 NOTE — H&P
[facility], [place] [address]          Pre-procedure History and Physical       NAME:  Sharon Day   :   1955   MRN:   [id number]     CHIEF COMPLAINT/HPI: uc    PMH:  Past Medical History:   Diagnosis Date    Hypertension     Pyodermic gangrenosum     Ulcerative colitis        PSH:  Past Surgical History:   Procedure Laterality Date    COLONOSCOPY N/A 2016    COLONOSCOPY performed by Shiva Heredia MD at [address] COLONOSCOPY N/A 2018    COLONOSCOPY performed by Quin Quiroz MD at [address] HX COLONOSCOPY      HX HYSTERECTOMY         Allergies: Allergies   Allergen Reactions    Ace Inhibitors Cough       Home Medications:  Prior to Admission Medications   Prescriptions Last Dose Informant Patient Reported? Taking? CALCIUM PO 2021 at Unknown time  Yes Yes   Sig: Take  by mouth. aspirin delayed-release 81 mg tablet 2021  Yes No   Sig: Take  by mouth daily. folic acid (FOLVITE) 1 mg tablet 2021 at Unknown time  Yes Yes   Sig: Take 1 mg by mouth daily. sulfaSALAzine (AZULFIDINE) 500 mg tablet 2021 at Unknown time  Yes Yes   Sig: Take 500 mg by mouth two (2) times a day. valsartan-hydroCHLOROthiazide (Diovan HCT) 80-12.5 mg per tablet 2021 at Unknown time  No Yes   Sig: Take 1 Tab by mouth daily.       Facility-Administered Medications: None       Hospital Medications:  Current Facility-Administered Medications   Medication Dose Route Frequency    0.9% sodium chloride infusion  150 mL/hr IntraVENous CONTINUOUS    sodium chloride (NS) flush 5-40 mL  5-40 mL IntraVENous Q8H    sodium chloride (NS) flush 5-40 mL  5-40 mL IntraVENous PRN    midazolam (VERSED) injection 0.25-5 mg  0.25-5 mg IntraVENous Multiple    fentaNYL citrate (PF) injection 200 mcg  200 mcg IntraVENous Multiple    simethicone (MYLICON) 75EL/3.9LE oral drops 80 mg  1.2 mL Oral Multiple    atropine injection 0.5 mg  0.5 mg IntraVENous ONCE PRN    EPINEPHrine (ADRENALIN) 0.1 mg/mL syringe 1 mg  1 mg Endoscopically ONCE PRN     Facility-Administered Medications Ordered in Other Encounters   Medication Dose Route Frequency    0.9% sodium chloride infusion   IntraVENous CONTINUOUS       Family History:  Family History   Problem Relation Age of Onset    Hypertension Mother     Cancer Father         lung cancer       Social History:  Social History     Tobacco Use    Smoking status: Never Smoker    Smokeless tobacco: Never Used   Substance Use Topics    Alcohol use: Yes     Comment: rare       The patient was counseled at length about the risks of cleo Covid-19 in the toma-operative and post-operative states including the recovery window of their procedure. The patient was made aware that cleo Covid-19 after a surgical procedure may worsen their prognosis for recovering from the virus and lend to a higher morbidity and or mortality risk. The patient was given the options of postponing their procedure. All of the risks, benefits, and alternatives were discussed. The patient does  wish to proceed with the procedure. PHYSICAL EXAM PRIOR TO SEDATION:  General: Alert, in no acute distress    Lungs:            CTA bilaterally  Heart:  Normal S1, S2    Abdomen: Soft, Non distended, Non tender. Normoactive bowel sounds. Assessment:   Stable for sedation administration.   Date of last colonoscopy: 2 yrs, Polyps  Yes    Plan:     · Endoscopic procedure with sedation     Signed By: Misty Benavidez MD     1/22/2021  1:11 PM

## 2021-01-22 NOTE — PROGRESS NOTES
[facility]  1955  [id number]    Situation:  Verbal report received from: Angella KRAUS  Procedure: Procedure(s):  COLONOSCOPY  :-  COLON BIOPSY  ENDOSCOPIC POLYPECTOMY    Background:    Preoperative diagnosis: ULCERTIVE COLITIS  Postoperative diagnosis: cecal POLYP, ulcerative colitis    :  Dr. Meche Carrillo  Assistant(s): Endoscopy Technician-1: Rebekah KRAUS  Endoscopy RN-1: Salvador Ramirez RN    Specimens:   ID Type Source Tests Collected by Time Destination   1 : pathology Preservative Cecum  Nabil Wallace MD 1/22/2021 1322 Pathology   2 : right colon Preservative   Nabil Wallace MD 1/22/2021 1325 Pathology   3 : pathology Preservative Colon, Transverse  Nabil Wallace MD 1/22/2021 1328 Pathology   4 : pathology Preservative Colon, Descending  Nabil Wallace MD 1/22/2021 1330 Pathology   5 : recto-sigmoid Preservative   Nabil Wallace MD 1/22/2021 1332 Pathology     H. Pylori  no    Assessment:    Anesthesia gave intra-procedure sedation and medications, see anesthesia flow sheet yes    Intravenous fluids: NS@ KVO     Vital signs stable     Abdominal assessment: round and soft     Recommendation:  Discharge patient per MD order.     Family or Friend   Permission to share finding with family or friend yes

## 2021-01-22 NOTE — DISCHARGE INSTRUCTIONS
[facility] 9[address] Eddie Shields M.D.  [address] [facility], [address]  [phone]          COLONOSCOPY [address]   [id number]  1955    DISCOMFORT:  Redness at IV site- apply warm compress to area; if redness or soreness persist- contact your physician  There may be a slight amount of blood passed from the rectum  Gaseous discomfort- walking, belching will help relieve any discomfort  You may not operate a vehicle for 12 hours  You may not engage in an occupation involving machinery or appliances for the  rest of today  You may not drink alcoholic beverages for at least 12 hours  Avoid making any critical decisions for at least 24 hours    DIET:   You may resume your normal diet, but some patients find that heavy or large  meals may lead to indigestion or vomiting. I suggest a light meal as first food  intake. I recommend a whole food, plant-based diet for your overall health. ACTIVITY:  You may resume your normal daily activities. It is recommended that you spend the remainder of the day resting - avoid any strenuous activity. CALL M.D. IF ANY SIGN OF:   Increasing pain, nausea, vomiting  Abdominal distension (swelling)  Significant bleeding (oral or rectal)  Fever   Pain in chest area  Shortness of breath    Additional Instructions:   Call Dr. Eddie Shields if any questions or problems at [phone]   You should receive the biopsy results by phone or mail within 3 weeks, if not, call  my office for the results      Should have a repeat colonoscopy in 2 years. Colonoscopy showed one polyp removed and mild colitis of the L colon. Learning About Coronavirus [phone])  Coronavirus [phone]): Overview  What is coronavirus (COVID-19)? The coronavirus disease (COVID-19) is caused by a virus. It is an illness that was first found in [place], [place], in December 2019. It has since spread worldwide.   The virus can cause fever, cough, and trouble breathing. In severe cases, it can cause pneumonia and make it hard to breathe without help. It can cause death. Coronaviruses are a large group of viruses. They cause the common cold. They also cause more serious illnesses like Middle East respiratory syndrome (MERS) and severe acute respiratory syndrome (SARS). COVID-19 is caused by a novel coronavirus. That means it's a new type that has not been seen in people before. This virus spreads person-to-person through droplets from coughing and sneezing. It can also spread when you are close to someone who is infected. And it can spread when you touch something that has the virus on it, such as a doorknob or a tabletop. What can you do to protect yourself from coronavirus (COVID-19)? The best way to protect yourself from getting sick is to:  · Avoid areas where there is an outbreak. · Avoid contact with people who may be infected. · Wash your hands often with soap or alcohol-based hand sanitizers. · Avoid crowds and try to stay at least 6 feet away from other people. · Wash your hands often, especially after you cough or sneeze. Use soap and water, and scrub for at least 20 seconds. If soap and water aren't available, use an alcohol-based hand . · Avoid touching your mouth, nose, and eyes. What can you do to avoid spreading the virus to others? To help avoid spreading the virus to others:  · Cover your mouth with a tissue when you cough or sneeze. Then throw the tissue in the trash. · Use a disinfectant to clean things that you touch often. · Stay home if you are sick or have been exposed to the virus. Don't go to school, work, or public areas. And don't use public transportation. · If you are sick:  ? Leave your home only if you need to get medical care. But call the doctor's office first so they know you're coming. And wear a face mask, if you have one.  ? If you have a face mask, wear it whenever you're around other people.  It can help stop the spread of the virus when you cough or sneeze. ? Clean and disinfect your home every day. Use household  and disinfectant wipes or sprays. Take special care to clean things that you grab with your hands. These include doorknobs, remote controls, phones, and handles on your refrigerator and microwave. And don't forget countertops, tabletops, bathrooms, and computer keyboards. When to call for help  Call 911 anytime you think you may need emergency care. For example, call if:  · You have severe trouble breathing. (You can't talk at all.)  · You have constant chest pain or pressure. · You are severely dizzy or lightheaded. · You are confused or can't think clearly. · Your face and lips have a blue color. · You pass out (lose consciousness) or are very hard to wake up. Call your doctor now if you develop symptoms such as:  · Shortness of breath. · Fever. · Cough. If you need to get care, call ahead to the doctor's office for instructions before you go. Make sure you wear a face mask, if you have one, to prevent exposing other people to the virus. Where can you get the latest information? The following health organizations are tracking and studying this virus. Their websites contain the most up-to-date information. Caridad Leo also learn what to do if you think you may have been exposed to the virus. · U.S. Centers for Disease Control and Prevention (CDC): The CDC provides updated news about the disease and travel advice. The website also tells you how to prevent the spread of infection. www.cdc.gov  · World Health Organization [facility]): WHO offers information about the virus outbreaks. WHO also has travel advice. www.who.int  Current as of: April 1, 2020               Content Version: 12.4  © 2006-2020 Healthwise, Incorporated.    Care instructions adapted under license by your healthcare professional. If you have questions about a medical condition or this instruction, always ask your healthcare professional. [address] any warranty or liability for your use of this information.

## 2021-01-22 NOTE — ANESTHESIA PREPROCEDURE EVALUATION
Relevant Problems   No relevant active problems       Anesthetic History   No history of anesthetic complications            Review of Systems / Medical History  Patient summary reviewed, nursing notes reviewed and pertinent labs reviewed    Pulmonary  Within defined limits                 Neuro/Psych   Within defined limits           Cardiovascular  Within defined limits  Hypertension                   GI/Hepatic/Renal  Within defined limits         PUD     Endo/Other  Within defined limits           Other Findings              Physical Exam    Airway  Mallampati: II  TM Distance: > 6 cm  Neck ROM: normal range of motion   Mouth opening: Normal     Cardiovascular  Regular rate and rhythm,  S1 and S2 normal,  no murmur, click, rub, or gallop             Dental  No notable dental hx       Pulmonary  Breath sounds clear to auscultation               Abdominal  GI exam deferred       Other Findings            Anesthetic Plan    ASA: 2  Anesthesia type: MAC            Anesthetic plan and risks discussed with: Patient

## 2021-03-03 RX ORDER — VALSARTAN AND HYDROCHLOROTHIAZIDE 80; 12.5 MG/1; MG/1
1 TABLET, FILM COATED ORAL DAILY
Qty: 90 TAB | Refills: 1 | Status: SHIPPED
Start: 2021-03-03 | End: 2021-09-07 | Stop reason: DRUGHIGH

## 2021-03-03 NOTE — TELEPHONE ENCOUNTER
----- Message from [address] [place] [address] I 20. Nesha Vallecillo sent at 3/2/2021 10:29 PM EST -----  Regarding: Prescription Question  Contact: [phone]  I need to refill my BP med:Valsartan-HCTZ  80-12.5mg. My BP today was 119/68. Rafael Johnston my next visit with should be around June-July 2021.   Pharmacy is [facility]

## 2021-08-16 ENCOUNTER — TRANSCRIBE ORDER (OUTPATIENT)
Dept: SCHEDULING | Age: 66
End: 2021-08-16

## 2021-08-16 DIAGNOSIS — Z12.31 BREAST CANCER SCREENING BY MAMMOGRAM: Primary | ICD-10-CM

## 2021-09-07 ENCOUNTER — OFFICE VISIT (OUTPATIENT)
Dept: FAMILY MEDICINE CLINIC | Age: 66
End: 2021-09-07
Payer: MEDICARE

## 2021-09-07 VITALS
WEIGHT: 146 LBS | SYSTOLIC BLOOD PRESSURE: 143 MMHG | BODY MASS INDEX: 24.92 KG/M2 | TEMPERATURE: 97.6 F | OXYGEN SATURATION: 97 % | DIASTOLIC BLOOD PRESSURE: 67 MMHG | HEIGHT: 64 IN | HEART RATE: 76 BPM

## 2021-09-07 DIAGNOSIS — I73.9 PERIPHERAL VASCULAR DISEASE (HCC): ICD-10-CM

## 2021-09-07 DIAGNOSIS — K51.30 ULCERATIVE RECTOSIGMOIDITIS WITHOUT COMPLICATION (HCC): ICD-10-CM

## 2021-09-07 DIAGNOSIS — Z71.89 ADVANCED DIRECTIVES, COUNSELING/DISCUSSION: ICD-10-CM

## 2021-09-07 DIAGNOSIS — Z23 ENCOUNTER FOR IMMUNIZATION: ICD-10-CM

## 2021-09-07 DIAGNOSIS — I10 ESSENTIAL HYPERTENSION: ICD-10-CM

## 2021-09-07 DIAGNOSIS — Z78.0 POSTMENOPAUSAL STATE: ICD-10-CM

## 2021-09-07 DIAGNOSIS — E78.49 OTHER HYPERLIPIDEMIA: ICD-10-CM

## 2021-09-07 DIAGNOSIS — Z00.00 WELCOME TO MEDICARE PREVENTIVE VISIT: Primary | ICD-10-CM

## 2021-09-07 PROCEDURE — G8419 CALC BMI OUT NRM PARAM NOF/U: HCPCS | Performed by: FAMILY MEDICINE

## 2021-09-07 PROCEDURE — G0402 INITIAL PREVENTIVE EXAM: HCPCS | Performed by: FAMILY MEDICINE

## 2021-09-07 PROCEDURE — G0009 ADMIN PNEUMOCOCCAL VACCINE: HCPCS | Performed by: FAMILY MEDICINE

## 2021-09-07 PROCEDURE — 90732 PPSV23 VACC 2 YRS+ SUBQ/IM: CPT | Performed by: FAMILY MEDICINE

## 2021-09-07 PROCEDURE — 1090F PRES/ABSN URINE INCON ASSESS: CPT | Performed by: FAMILY MEDICINE

## 2021-09-07 PROCEDURE — 90715 TDAP VACCINE 7 YRS/> IM: CPT | Performed by: FAMILY MEDICINE

## 2021-09-07 PROCEDURE — 99497 ADVNCD CARE PLAN 30 MIN: CPT | Performed by: FAMILY MEDICINE

## 2021-09-07 PROCEDURE — G8510 SCR DEP NEG, NO PLAN REQD: HCPCS | Performed by: FAMILY MEDICINE

## 2021-09-07 PROCEDURE — G8536 NO DOC ELDER MAL SCRN: HCPCS | Performed by: FAMILY MEDICINE

## 2021-09-07 PROCEDURE — G8427 DOCREV CUR MEDS BY ELIG CLIN: HCPCS | Performed by: FAMILY MEDICINE

## 2021-09-07 RX ORDER — LOSARTAN POTASSIUM AND HYDROCHLOROTHIAZIDE 12.5; 5 MG/1; MG/1
1 TABLET ORAL DAILY
Qty: 90 TABLET | Refills: 1 | Status: SHIPPED | OUTPATIENT
Start: 2021-09-07 | End: 2022-02-18 | Stop reason: SDUPTHER

## 2021-09-07 NOTE — PATIENT INSTRUCTIONS
1. Essential hypertension  bp is nearly goal  Change to losartan hctz again that was working, pt feels some side effects on this valsartan hczt which was switched to when there was a shortage  - losartan-hydroCHLOROthiazide (HYZAAR) 50-12.5 mg per tablet; Take 1 Tablet by mouth daily. Dispense: 90 Tablet; Refill: 1  - LIPID PANEL; Future  - METABOLIC PANEL, COMPREHENSIVE; Future  - CBC WITH AUTOMATED DIFF; Future    2. Ulcerative rectosigmoiditis without complication (HCC)  On sulfasalazine, per gi    3. Peripheral vascular disease ([facility] [address]  stable      5. Advanced directives, counseling/discussion  Done today see note  - FULL CODE  - ADVANCE CARE PLANNING FIRST 30 MINS    6. Welcome to Medicare preventive visit  Done today see ntoe  - FULL CODE  - ADVANCE CARE PLANNING FIRST 30 MINS  - DEXA BONE DENSITY STUDY AXIAL; Future  - PNEUMOCOCCAL POLYSACCHARIDE VACCINE, 23-VALENT, ADULT OR IMMUNOSUPPRESSED PT DOSE,  - ADMIN PNEUMOCOCCAL VACCINE  - TETANUS, DIPHTHERIA TOXOIDS AND ACELLULAR PERTUSSIS VACCINE (TDAP), IN INDIVIDS. >=7, IM    7. Postmenopausal state  Due for dexa per oders  - DEXA BONE DENSITY STUDY AXIAL; Future    8. Encounter for immunization  Recommend pneumovax, tdap, flu, covid shots  Reports covid utd  Will get flu this season    - PNEUMOCOCCAL POLYSACCHARIDE VACCINE, 23-VALENT, ADULT OR IMMUNOSUPPRESSED PT DOSE,  - ADMIN PNEUMOCOCCAL VACCINE    9. Other hyperlipidemia  Recommend labs  - LIPID PANEL; Future  - METABOLIC PANEL, COMPREHENSIVE;  Future

## 2021-09-07 NOTE — PROGRESS NOTES
Identified pt with two pt identifiers(name and ). Reviewed record in preparation for visit and have obtained necessary documentation. Chief Complaint   Patient presents with    Complete Physical        Vitals:    21 1103   BP: (!) 143/67   Pulse: 76   Temp: 97.6 °F (36.4 °C)   TempSrc: Temporal   SpO2: 97%   Weight: 146 lb (66.2 kg)   Height: 5' 4\" (1.626 m)   PainSc:   0 - No pain       Health Maintenance Due   Topic    COVID-19 Vaccine (1)    DTaP/Tdap/Td series (1 - Tdap)    Bone Densitometry (Dexa) Screening     Pneumococcal 65+ years (1 of 1 - PPSV23)    Flu Vaccine (1)    Medicare Yearly Exam        Coordination of Care Questionnaire:  :   1) Have you been to an emergency room, urgent care, or hospitalized since your last visit? If yes, where when, and reason for visit? No      2. Have seen or consulted any other health care provider since your last visit? If yes, where when, and reason for visit?   No

## 2021-09-07 NOTE — ACP (ADVANCE CARE PLANNING)
Advance Care Planning     Advance Care Planning (ACP) Physician/NP/PA Conversation      Date of Conversation: 9/7/2021  Conducted with: Patient with Decision Making Capacity    Healthcare Decision Maker:   No healthcare decision makers have been documented. Click here to complete [address] including selection of the Healthcare Decision Maker Relationship (ie \"Primary\")      Today we documented Decision Maker(s) consistent with Legal Next of Kin hierarchy. Care Preferences:    Hospitalization: \"If your health worsens and it becomes clear that your chance of recovery is unlikely, what would be your preference regarding hospitalization? \"  The patient would prefer hospitalization. Ventilation: \"If you were unable to breathe on your own and your chance of recovery was unlikely, what would be your preference about the use of a ventilator (breathing machine) if it was available to you? \"   The patient would desire the use of a ventilator. Resuscitation: \"In the event your heart stopped as a result of an underlying serious health condition, would you want attempts to be made to restart your heart, or would you prefer a natural death? \"   Yes, attempt to resuscitate.     Additional topics discussed: treatment goals, benefit/burden of treatment options, ventilation preferences, hospitalization preferences and resuscitation preferences    Conversation Outcomes / Follow-Up Plan:   ACP in process - information provided, considering goals and options  Reviewed DNR/DNI and patient elects Full Code (Attempt Resuscitation)     Length of Voluntary ACP Conversation in minutes:  16 minutes    Noel Rogers MD

## 2021-09-07 NOTE — PROGRESS NOTES
Family Medicine Follow-Up Progress Note  Patient: Laz Kent  1955, 73 y.o., female  Encounter Date: 9/7/2021    ASSESSMENT & PLAN    ICD-10-CM ICD-9-CM    1. Welcome to Medicare preventive visit  Z00.00 V70.0 FULL CODE      ADVANCE CARE PLANNING FIRST 30 MINS      DEXA BONE DENSITY STUDY AXIAL      PNEUMOCOCCAL POLYSACCHARIDE VACCINE, 23-VALENT, ADULT OR IMMUNOSUPPRESSED PT DOSE,      ADMIN PNEUMOCOCCAL VACCINE      TETANUS, DIPHTHERIA TOXOIDS AND ACELLULAR PERTUSSIS VACCINE (TDAP), IN INDIVIDS. >=7, IM   2. Advanced directives, counseling/discussion  Z71.89 V65.49 FULL CODE      ADVANCE CARE PLANNING FIRST 30 MINS   3. Postmenopausal state  Z78.0 V49.81 DEXA BONE DENSITY STUDY AXIAL   4. Essential hypertension  I10 401.9 losartan-hydroCHLOROthiazide (HYZAAR) 50-12.5 mg per tablet      LIPID PANEL      METABOLIC PANEL, COMPREHENSIVE      CBC WITH AUTOMATED DIFF   5. Ulcerative rectosigmoiditis without complication (HCC)  P31.27 556.3    6. Peripheral vascular disease (HCC)  I73.9 443.9    7. Encounter for immunization  Z23 V03.89 PNEUMOCOCCAL POLYSACCHARIDE VACCINE, 23-VALENT, ADULT OR IMMUNOSUPPRESSED PT DOSE,      ADMIN PNEUMOCOCCAL VACCINE   8.  Other hyperlipidemia  E78.49 272.4 LIPID PANEL      METABOLIC PANEL, COMPREHENSIVE       Orders Placed This Encounter    Order - Advanced Care Planning (16-30 minutes)    Dexa Bone Density Study Axial ([id number])     Standing Status:   Future     Standing Expiration Date:   3/6/2022     Order Specific Question:   Reason for Exam     Answer:   Screening    Pneumococcal Polysaccharide (Pneumovax)    TETANUS, DIPHTHERIA TOXOIDS AND ACELLULAR PERTUSSIS VACCINE (TDAP), IN INDIVIDS. >=7, IM    LIPID PANEL     Standing Status:   Future     Standing Expiration Date:   3/8/3777    METABOLIC PANEL, COMPREHENSIVE     Standing Status:   Future     Standing Expiration Date:   9/7/2022    CBC WITH AUTOMATED DIFF     Standing Status:   Future     Standing Expiration Date: 9/7/2022    Code Status - Full Code    Pneumococcal Admin ()    ascorbic acid,sod/zinc ox,gluc (ZINC AND C PO)     Sig: Take  by mouth.  ascorbic acid (VITAMIN C PO)     Sig: Take  by mouth.  losartan-hydroCHLOROthiazide (HYZAAR) 50-12.5 mg per tablet     Sig: Take 1 Tablet by mouth daily. Dispense:  90 Tablet     Refill:  1       Patient Instructions   1. Essential hypertension  bp is nearly goal  Change to losartan hctz again that was working, pt feels some side effects on this valsartan hczt which was switched to when there was a shortage  - losartan-hydroCHLOROthiazide (HYZAAR) 50-12.5 mg per tablet; Take 1 Tablet by mouth daily. Dispense: 90 Tablet; Refill: 1  - LIPID PANEL; Future  - METABOLIC PANEL, COMPREHENSIVE; Future  - CBC WITH AUTOMATED DIFF; Future    2. Ulcerative rectosigmoiditis without complication (HCC)  On sulfasalazine, per gi    3. Peripheral vascular disease ([facility] [address]  stable      5. Advanced directives, counseling/discussion  Done today see note  - FULL CODE  - ADVANCE CARE PLANNING FIRST 30 MINS    6. Welcome to Medicare preventive visit  Done today see ntoe  - FULL CODE  - ADVANCE CARE PLANNING FIRST 30 MINS  - DEXA BONE DENSITY STUDY AXIAL; Future  - PNEUMOCOCCAL POLYSACCHARIDE VACCINE, 23-VALENT, ADULT OR IMMUNOSUPPRESSED PT DOSE,  - ADMIN PNEUMOCOCCAL VACCINE  - TETANUS, DIPHTHERIA TOXOIDS AND ACELLULAR PERTUSSIS VACCINE (TDAP), IN INDIVIDS. >=7, IM    7. Postmenopausal state  Due for dexa per oders  - DEXA BONE DENSITY STUDY AXIAL; Future    8. Encounter for immunization  Recommend pneumovax, tdap, flu, covid shots  Reports covid utd  Will get flu this season    - PNEUMOCOCCAL POLYSACCHARIDE VACCINE, 23-VALENT, ADULT OR IMMUNOSUPPRESSED PT DOSE,  - ADMIN PNEUMOCOCCAL VACCINE    9. Other hyperlipidemia  Recommend labs  - LIPID PANEL; Future  - METABOLIC PANEL, COMPREHENSIVE;  Future        CHIEF COMPLAINT  Chief Complaint   Patient presents with    Complete Physical Alethea Reid is a 73 y.o. female presenting today for follow up  HTN: patient reports stable on medications. No chest pain, sob, headache, blurred vision, leg swelling, diaphoresis, falls. Compliant with medications as prescribed. Is not regularly checking blood pressures at home and reports range is unknown. No significant hyper or hypotensive episodes that the patient reports today. Is utd on covid vaccines  Planning for flu shot this fall  Ok to pneumovax today    ROS  Review of Systems  A 12 point review of systems was negative except as noted here or in the HPI. OBJECTIVE  Visit Vitals  BP (!) 143/67 (BP 1 Location: Left upper arm, BP Patient Position: Sitting, BP Cuff Size: Adult)   Pulse 76   Temp 97.6 °F (36.4 °C) (Temporal)   Ht 5' 4\" (1.626 m)   Wt 146 lb (66.2 kg)   LMP  (LMP Unknown)   SpO2 97%   BMI 25.06 kg/m²       Physical Exam  Vitals and nursing note reviewed. Constitutional:       General: She is not in acute distress. Appearance: Normal appearance. She is normal weight. She is not ill-appearing, toxic-appearing or diaphoretic. HENT:      Head: Normocephalic and atraumatic. Right Ear: External ear normal.      Left Ear: External ear normal.   Eyes:      General: No scleral icterus. Right eye: No discharge. Left eye: No discharge. Comments: eom grossly intact   Neck:      Vascular: No carotid bruit. Comments: No visible neck masses , ROM appears normal from visual inspection  Cardiovascular:      Rate and Rhythm: Normal rate and regular rhythm. Heart sounds: No murmur heard. No friction rub. No gallop. Pulmonary:      Effort: Pulmonary effort is normal. No respiratory distress. Skin:     Comments: Visible skin is without jaundice, bruising, lesion, pallor, erythema or rash except as otherwise noted   Neurological:      General: No focal deficit present.       Mental Status: She is alert and oriented to person, place, and time.   Psychiatric:         Mood and Affect: Mood normal.         Behavior: Behavior normal.         Thought Content: Thought content normal.         Judgment: Judgment normal.         No results found for any visits on 09/07/21. HISTORICAL  Reviewed and updated today, and as noted below:    Past Medical History:   Diagnosis Date    Hypertension     Pyodermic gangrenosum     Ulcerative colitis      Past Surgical History:   Procedure Laterality Date    COLONOSCOPY N/A 11/18/2016    COLONOSCOPY performed by Ashok Maurice MD at [facility] ENDOSCOPY    COLONOSCOPY N/A 12/4/2018    COLONOSCOPY performed by Radha Garcia MD at [facility] ENDOSCOPY    COLONOSCOPY N/A 1/22/2021    COLONOSCOPY  :- performed by Radha Garcia MD at [facility] ENDOSCOPY    HX COLONOSCOPY  2014    HX HYSTERECTOMY       Family History   Problem Relation Age of Onset    Hypertension Mother     Cancer Father         lung cancer     Social History     Tobacco Use   Smoking Status Never Smoker   Smokeless Tobacco Never Used     Social History     Socioeconomic History    Marital status:      Spouse name: Not on file    Number of children: Not on file    Years of education: Not on file    Highest education level: Not on file   Tobacco Use    Smoking status: Never Smoker    Smokeless tobacco: Never Used   Vaping Use    Vaping Use: Never used   Substance and Sexual Activity    Alcohol use: Yes     Comment: rare    Sexual activity: Yes     Partners: Male     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 Health     Financial Resource Strain:     Difficulty of Paying Living Expenses:    Food Insecurity:     Worried About Running Out of Food in the Last Year:     [address] in the Last Year:    Transportation Needs:     Lack of Transportation (Medical):      Lack of Transportation (Non-Medical):    Physical Activity:     Days of Exercise per Week:     Minutes of Exercise per Session:    Stress:     Feeling of Stress : Social Connections:     Frequency of Communication with Friends and Family:     Frequency of Social Gatherings with Friends and Family:     Attends Sikh Services:     Active Member of Clubs or Organizations:     Attends Club or Organization Meetings:     Marital Status:      Allergies   Allergen Reactions    Ace Inhibitors Cough       LAB REVIEW  Lab Results   Component Value Date/Time    Sodium 141 07/24/2020 11:14 AM    Potassium 4.2 07/24/2020 11:14 AM    Chloride 102 07/24/2020 11:14 AM    CO2 24 07/24/2020 11:14 AM    Anion gap 8 09/08/2010 11:06 AM    Glucose 84 07/24/2020 11:14 AM    BUN 9 07/24/2020 11:14 AM    Creatinine 0.64 07/24/2020 11:14 AM    BUN/Creatinine ratio 14 07/24/2020 11:14 AM    GFR est  07/24/2020 11:14 AM    GFR est non-AA 95 07/24/2020 11:14 AM    Calcium 9.6 07/24/2020 11:14 AM    Bilirubin, total 0.5 07/24/2020 11:14 AM    Alk. phosphatase 69 07/24/2020 11:14 AM    Protein, total 7.6 07/24/2020 11:14 AM    Albumin 4.6 07/24/2020 11:14 AM    Globulin 3.7 09/08/2010 11:06 AM    A-G Ratio 1.5 07/24/2020 11:14 AM    ALT (SGPT) 17 07/24/2020 11:14 AM     Lab Results   Component Value Date/Time    WBC 4.5 07/24/2020 11:14 AM    HGB 11.1 07/24/2020 11:14 AM    HCT 33.7 (L) 07/24/2020 11:14 AM    PLATELET 290 47/44/8479 11:14 AM    MCV 93 07/24/2020 11:14 AM     No results found for: HBA1C, TJJ9QLHP, TYM7VZNF, AFH3EYUT  Lab Results   Component Value Date/Time    Cholesterol, total 193 07/24/2020 11:14 AM    HDL Cholesterol 58 07/24/2020 11:14 AM    LDL, calculated 120 (H) 07/24/2020 11:14 AM    VLDL, calculated 15 07/24/2020 11:14 AM    Triglyceride 77 07/24/2020 11:14 AM           Sourav Hayes MD  [facility]  09/07/21 11:44 AM    Portions of this note may have been populated using smart dictation software and may have \"sounds-like\" errors present. Pt was counseled on risks, benefits and alternatives of treatment options.  All questions were asked and answered and the patient was agreeable with the treatment plan as outlined.

## 2021-09-07 NOTE — PROGRESS NOTES
This is a \"Welcome to [employer]"  Initial Preventive Physical Examination (IPPE) providing Personalized Prevention Plan Services (Performed in the first 12 months of enrollment)    I have reviewed the patient's medical history in detail and updated the computerized patient record. Assessment/Plan   Education and counseling provided:  Are appropriate based on today's review and evaluation    1. Welcome to Medicare preventive visit  -     FULL CODE  -     ADVANCE CARE PLANNING FIRST 30 MINS  -     DEXA BONE DENSITY STUDY AXIAL; Future  -     PNEUMOCOCCAL POLYSACCHARIDE VACCINE, 23-VALENT, ADULT OR IMMUNOSUPPRESSED PT DOSE,  -     ADMIN PNEUMOCOCCAL VACCINE  2. Essential hypertension  3. Ulcerative rectosigmoiditis without complication ([facility] [address]  4. Peripheral vascular disease ([facility] [address]  5. Well woman exam (no gynecological exam)  6. Advanced directives, counseling/discussion  -     FULL CODE  -     ADVANCE CARE PLANNING FIRST 30 MINS  7. Postmenopausal state  -     DEXA BONE DENSITY STUDY AXIAL; Future  8. Encounter for immunization  -     PNEUMOCOCCAL POLYSACCHARIDE VACCINE, 23-VALENT, ADULT OR IMMUNOSUPPRESSED PT DOSE,  -     ADMIN PNEUMOCOCCAL VACCINE       Depression Risk Screen     3 most recent PHQ Screens 9/7/2021   Little interest or pleasure in doing things Not at all   Feeling down, depressed, irritable, or hopeless Not at all   Total Score PHQ 2 0       Alcohol Risk Screen    Do you average more than 1 drink per night or more than 7 drinks a week:  No    On any one occasion in the past three months have you have had more than 3 drinks containing alcohol:  No          Functional Ability and Level of Safety    Diet: No special diet      Hearing: Hearing is good. Vision Screening:  Vision is good. No exam data present      Activities of Daily Living:   The home contains: handrails  Patient does total self care      Ambulation: with no difficulty      Exercise level: moderately active     Fall Risk Screen:  Fall Risk Assessment, last 12 mths 9/7/2021   Able to walk? Yes   Fall in past 12 months? 0   Do you feel unsteady? 0      Abuse Screen:  Patient is not abused       Screening EKG   EKG order placed: No    End of Life Planning   Advanced care planning directives were discussed with the patient and /or family/caregiver. Health Maintenance Due     Health Maintenance Due   Topic Date Due    COVID-19 Vaccine (1) Never done    DTaP/Tdap/Td series (1 - Tdap) Never done    Bone Densitometry (Dexa) Screening  06/26/2021    Pneumococcal 65+ years (1 of 1 - PPSV23) Never done    Flu Vaccine (1) 09/01/2021       Patient Care Team   Patient Care Team:  Abram Narayan MD as PCP - General (Family Medicine)  Abram Narayan MD as PCP - [facility]  Vernon Mora RN as [facility] (Internal Medicine)    History     Past Medical History:   Diagnosis Date    Hypertension     Pyodermic gangrenosum     Ulcerative colitis       Past Surgical History:   Procedure Laterality Date    COLONOSCOPY N/A 11/18/2016    COLONOSCOPY performed by Raleigh Barbosa MD at [facility]. N/A 12/4/2018    COLONOSCOPY performed by Kenrick Stevens MD at [facility]. N/A 1/22/2021    COLONOSCOPY  :- performed by Kenrick Stevens MD at [facility] ENDOSCOPY    HX COLONOSCOPY  2014    HX HYSTERECTOMY       Current Outpatient Medications   Medication Sig Dispense Refill    ascorbic acid,sod/zinc ox,gluc (ZINC AND C PO) Take  by mouth.  ascorbic acid (VITAMIN C PO) Take  by mouth.  valsartan-hydroCHLOROthiazide (Diovan HCT) 80-12.5 mg per tablet Take 1 Tab by mouth daily. 90 Tab 1    aspirin delayed-release 81 mg tablet Take  by mouth daily.  folic acid (FOLVITE) 1 mg tablet Take 1 mg by mouth daily.  CALCIUM PO Take  by mouth.  sulfaSALAzine (AZULFIDINE) 500 mg tablet Take 500 mg by mouth two (2) times a day.        Allergies   Allergen Reactions    Ace Inhibitors Cough       Family History   Problem Relation Age of Onset    Hypertension Mother     Cancer Father         lung cancer     Social History     Tobacco Use    Smoking status: Never Smoker    Smokeless tobacco: Never Used   Substance Use Topics    Alcohol use: Yes     Comment: cecilio Miller MD

## 2021-10-04 ENCOUNTER — HOSPITAL ENCOUNTER (OUTPATIENT)
Dept: MAMMOGRAPHY | Age: 66
Discharge: HOME | End: 2021-10-04
Attending: OBSTETRICS & GYNECOLOGY
Payer: MEDICARE

## 2021-10-04 DIAGNOSIS — Z12.31 BREAST CANCER SCREENING BY MAMMOGRAM: ICD-10-CM

## 2021-10-04 PROCEDURE — 77067 SCR MAMMO BI INCL CAD: CPT

## 2021-10-22 ENCOUNTER — OFFICE VISIT (OUTPATIENT)
Dept: FAMILY MEDICINE CLINIC | Age: 66
End: 2021-10-22
Payer: MEDICARE

## 2021-10-22 VITALS
HEIGHT: 64 IN | WEIGHT: 145 LBS | RESPIRATION RATE: 16 BRPM | BODY MASS INDEX: 24.75 KG/M2 | HEART RATE: 76 BPM | TEMPERATURE: 97.4 F | OXYGEN SATURATION: 96 % | DIASTOLIC BLOOD PRESSURE: 73 MMHG | SYSTOLIC BLOOD PRESSURE: 128 MMHG

## 2021-10-22 DIAGNOSIS — M19.049 HAND ARTHRITIS: Primary | ICD-10-CM

## 2021-10-22 DIAGNOSIS — K51.30 ULCERATIVE RECTOSIGMOIDITIS WITHOUT COMPLICATION (HCC): ICD-10-CM

## 2021-10-22 DIAGNOSIS — I10 ESSENTIAL HYPERTENSION: ICD-10-CM

## 2021-10-22 DIAGNOSIS — Z23 ENCOUNTER FOR IMMUNIZATION: ICD-10-CM

## 2021-10-22 PROCEDURE — 90686 IIV4 VACC NO PRSV 0.5 ML IM: CPT | Performed by: FAMILY MEDICINE

## 2021-10-22 PROCEDURE — G9899 SCRN MAM PERF RSLTS DOC: HCPCS | Performed by: FAMILY MEDICINE

## 2021-10-22 PROCEDURE — G8420 CALC BMI NORM PARAMETERS: HCPCS | Performed by: FAMILY MEDICINE

## 2021-10-22 PROCEDURE — G8752 SYS BP LESS 140: HCPCS | Performed by: FAMILY MEDICINE

## 2021-10-22 PROCEDURE — G0008 ADMIN INFLUENZA VIRUS VAC: HCPCS | Performed by: FAMILY MEDICINE

## 2021-10-22 PROCEDURE — G8427 DOCREV CUR MEDS BY ELIG CLIN: HCPCS | Performed by: FAMILY MEDICINE

## 2021-10-22 PROCEDURE — G8510 SCR DEP NEG, NO PLAN REQD: HCPCS | Performed by: FAMILY MEDICINE

## 2021-10-22 PROCEDURE — 3017F COLORECTAL CA SCREEN DOC REV: CPT | Performed by: FAMILY MEDICINE

## 2021-10-22 PROCEDURE — G8536 NO DOC ELDER MAL SCRN: HCPCS | Performed by: FAMILY MEDICINE

## 2021-10-22 PROCEDURE — 99214 OFFICE O/P EST MOD 30 MIN: CPT | Performed by: FAMILY MEDICINE

## 2021-10-22 PROCEDURE — 1101F PT FALLS ASSESS-DOCD LE1/YR: CPT | Performed by: FAMILY MEDICINE

## 2021-10-22 PROCEDURE — G8754 DIAS BP LESS 90: HCPCS | Performed by: FAMILY MEDICINE

## 2021-10-22 PROCEDURE — 1090F PRES/ABSN URINE INCON ASSESS: CPT | Performed by: FAMILY MEDICINE

## 2021-10-22 PROCEDURE — G8399 PT W/DXA RESULTS DOCUMENT: HCPCS | Performed by: FAMILY MEDICINE

## 2021-10-22 RX ORDER — DICLOFENAC SODIUM 10 MG/G
2 GEL TOPICAL 4 TIMES DAILY
Qty: 450 G | Refills: 1 | Status: SHIPPED
Start: 2021-10-22 | End: 2022-04-29

## 2021-10-22 NOTE — PROGRESS NOTES
Chief Complaint   Patient presents with    Hypertension     f/u htn med compliant and seems well controlled

## 2021-10-22 NOTE — PROGRESS NOTES
Family Medicine Follow-Up Progress Note  Patient: Steve Parisi  1955, 73 y.o., female  Encounter Date: 10/22/2021    ASSESSMENT & PLAN    ICD-10-CM ICD-9-CM    1. Hand arthritis  M19.049 716.94 REFERRAL TO ORTHOPEDICS      diclofenac (VOLTAREN) 1 % gel   2. Essential hypertension  I10 401.9    3. Ulcerative rectosigmoiditis without complication (HCC)  K51.65 556.3    4. Encounter for immunization  Z23 V03.89 INFLUENZA VIRUS VAC QUAD,SPLIT,PRESV FREE SYRINGE IM       Orders Placed This Encounter    INFLUENZA VIRUS VACCINE QUADRIVALENT, PRESERVATIVE FREE SYRINGE ([id number])   [facility]     Referral Priority:   Routine     Referral Type:   Consultation     Referral Reason:   Specialty Services Required     Referred to Provider:   Ranulfo Vickers MD     Number of Visits Requested:   1    diclofenac (VOLTAREN) 1 % gel     Sig: Apply 2 g to affected area four (4) times daily.  For hand arthritis     Dispense:  450 g     Refill:  1       L PIP arthritis, wonder about synovitis related to UC, can try voltaren, may be migratory/self limited but if persistent may be worth seeing hand surgery    bp at goal, good work, keep with this    Flu shot today    Call with concerns  CHIEF COMPLAINT  Chief Complaint   Patient presents with    Hypertension     f/u htn med compliant and seems well controlled       Tisha Horse is a 73 y.o. female presenting today for follow up  bp changes have done great--bp at goal, no side effects from med mgmt  hctz seems good for hand swelling  EXCEPT L 3rd digit (pt is R handed) with Limited ROM at PIP and pain, tenderness  Has UC that is controlled  Had pyoderma gangrenosum before and this feels similar except no ulceration noted  Has not responded much to anything  She is not using this digit more than others does not expect it should be an overuse injruy    ROS  Review of Systems  A 12 point review of systems was negative except as noted here or in the HPI.    OBJECTIVE  Visit Vitals  /73   Pulse 76   Temp 97.4 °F (36.3 °C) (Temporal)   Resp 16   Ht 5' 4\" (1.626 m)   Wt 145 lb (65.8 kg)   LMP  (LMP Unknown)   SpO2 96%   BMI 24.89 kg/m²       Physical Exam  Vitals and nursing note reviewed. Constitutional:       General: She is not in acute distress. Appearance: Normal appearance. She is normal weight. She is not ill-appearing, toxic-appearing or diaphoretic. HENT:      Head: Normocephalic and atraumatic. Right Ear: External ear normal.      Left Ear: External ear normal.   Eyes:      General: No scleral icterus. Right eye: No discharge. Left eye: No discharge. Comments: eom grossly intact   Neck:      Comments: No visible neck masses , ROM appears normal from visual inspection  Cardiovascular:      Rate and Rhythm: Normal rate and regular rhythm. Pulses: Normal pulses. Heart sounds: Normal heart sounds. No murmur heard. No friction rub. No gallop. Pulmonary:      Effort: Pulmonary effort is normal. No respiratory distress. Breath sounds: No stridor. No wheezing, rhonchi or rales. Musculoskeletal:      Right lower leg: No edema. Left lower leg: No edema. Comments: Limited ROM of L 3rd digit at the PIP   Skin:     General: Skin is warm and dry. Capillary Refill: Capillary refill takes less than 2 seconds. Comments: Visible skin is without jaundice, bruising, lesion, pallor, erythema or rash except as otherwise noted   Neurological:      General: No focal deficit present. Mental Status: She is alert and oriented to person, place, and time. Psychiatric:         Mood and Affect: Mood normal.         Behavior: Behavior normal.         Thought Content: Thought content normal.         Judgment: Judgment normal.         No results found for any visits on 10/22/21.     HISTORICAL  Reviewed and updated today, and as noted below:    Past Medical History:   Diagnosis Date    Hypertension  Pyodermic gangrenosum     Ulcerative colitis      Past Surgical History:   Procedure Laterality Date    COLONOSCOPY N/A 11/18/2016    COLONOSCOPY performed by Mayuri Solitario MD at [facility] ENDOSCOPY    COLONOSCOPY N/A 12/4/2018    COLONOSCOPY performed by Rita Arriaga MD at [facility] ENDOSCOPY    COLONOSCOPY N/A 1/22/2021    COLONOSCOPY  :- performed by Rita Arriaga MD at [facility] ENDOSCOPY    HX COLONOSCOPY  2014    HX HYSTERECTOMY       Family History   Problem Relation Age of Onset    Hypertension Mother     Cancer Father         lung cancer     Social History     Tobacco Use   Smoking Status Never Smoker   Smokeless Tobacco Never Used     Social History     Socioeconomic History    Marital status:      Spouse name: Not on file    Number of children: Not on file    Years of education: Not on file    Highest education level: Not on file   Tobacco Use    Smoking status: Never Smoker    Smokeless tobacco: Never Used   Vaping Use    Vaping Use: Never used   Substance and Sexual Activity    Alcohol use: Yes     Comment: rare    Sexual activity: Yes     Partners: Male     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 Health     Financial Resource Strain:     Difficulty of Paying Living Expenses:    Food Insecurity:     Worried About Running Out of Food in the Last Year:     [address] in the Last Year:    Transportation Needs:     Lack of Transportation (Medical):      Lack of Transportation (Non-Medical):    Physical Activity:     Days of Exercise per Week:     Minutes of Exercise per Session:    Stress:     Feeling of Stress :    Social Connections:     Frequency of Communication with Friends and Family:     Frequency of Social Gatherings with Friends and Family:     Attends [employer] Services:     Active Member of Clubs or Organizations:     Attends Club or Organization Meetings:     Marital Status:      Allergies   Allergen Reactions    Ace Inhibitors Cough       LAB REVIEW  Lab Results   Component Value Date/Time    Sodium 138 09/07/2021 04:21 PM    Potassium 4.1 09/07/2021 04:21 PM    Chloride 104 09/07/2021 04:21 PM    CO2 29 09/07/2021 04:21 PM    Anion gap 5 09/07/2021 04:21 PM    Glucose 90 09/07/2021 04:21 PM    BUN 14 09/07/2021 04:21 PM    Creatinine 0.57 09/07/2021 04:21 PM    BUN/Creatinine ratio 25 (H) 09/07/2021 04:21 PM    GFR est AA >60 09/07/2021 04:21 PM    GFR est non-AA >60 09/07/2021 04:21 PM    Calcium 9.2 09/07/2021 04:21 PM    Bilirubin, total 0.6 09/07/2021 04:21 PM    Alk. phosphatase 83 09/07/2021 04:21 PM    Protein, total 7.8 09/07/2021 04:21 PM    Albumin 4.3 09/07/2021 04:21 PM    Globulin 3.5 09/07/2021 04:21 PM    A-G Ratio 1.2 09/07/2021 04:21 PM    ALT (SGPT) 32 09/07/2021 04:21 PM     Lab Results   Component Value Date/Time    WBC 6.1 09/07/2021 04:21 PM    HGB 10.4 (L) 09/07/2021 04:21 PM    HCT 33.9 (L) 09/07/2021 04:21 PM    PLATELET 369 53/76/0854 04:21 PM    .5 (H) 09/07/2021 04:21 PM     No results found for: HBA1C, MNP0LMGM, UPM2KHIV, [id number]  Lab Results   Component Value Date/Time    Cholesterol, total 194 09/07/2021 04:21 PM    HDL Cholesterol 57 09/07/2021 04:21 PM    LDL, calculated 100.8 (H) 09/07/2021 04:21 PM    VLDL, calculated 36.2 09/07/2021 04:21 PM    Triglyceride 181 (H) 09/07/2021 04:21 PM    CHOL/HDL Ratio 3.4 09/07/2021 04:21 PM           Twyla Chester MD  [facility]  10/22/21 9:52 AM    Portions of this note may have been populated using smart dictation software and may have \"sounds-like\" errors present. Pt was counseled on risks, benefits and alternatives of treatment options. All questions were asked and answered and the patient was agreeable with the treatment plan as outlined.

## 2021-11-05 ENCOUNTER — HOSPITAL ENCOUNTER (OUTPATIENT)
Dept: MAMMOGRAPHY | Age: 66
Discharge: HOME | End: 2021-11-05
Attending: FAMILY MEDICINE
Payer: MEDICARE

## 2021-11-05 DIAGNOSIS — Z78.0 POSTMENOPAUSAL STATE: ICD-10-CM

## 2021-11-05 DIAGNOSIS — Z00.00 WELCOME TO MEDICARE PREVENTIVE VISIT: ICD-10-CM

## 2021-11-05 PROCEDURE — 77080 DXA BONE DENSITY AXIAL: CPT

## 2022-02-18 DIAGNOSIS — I10 ESSENTIAL HYPERTENSION: ICD-10-CM

## 2022-02-18 RX ORDER — LOSARTAN POTASSIUM AND HYDROCHLOROTHIAZIDE 12.5; 5 MG/1; MG/1
1 TABLET ORAL DAILY
Qty: 90 TABLET | Refills: 1 | Status: SHIPPED | OUTPATIENT
Start: 2022-02-18 | End: 2022-08-16

## 2022-02-18 NOTE — TELEPHONE ENCOUNTER
----- Message from [facility] [address] [place] sent at 2/18/2022  9:45 AM EST -----  Regarding: BP Medicine  I need a refill on my BP medicine. Presently on Losartan-HCTZ 50-12.5mg QD. BP this am was 126/74. Most of the time BP stays in range 130-140 over 74-79. I have switched my pharmacy to [facility] [address], phone number [phone]. I think I let your office of pharmacy change, but not sure.

## 2022-03-18 PROBLEM — I73.9 PERIPHERAL VASCULAR DISEASE (HCC): Status: ACTIVE | Noted: 2020-07-24

## 2022-03-28 ENCOUNTER — PATIENT MESSAGE (OUTPATIENT)
Dept: FAMILY MEDICINE CLINIC | Age: 67
End: 2022-03-28

## 2022-04-08 ENCOUNTER — TELEPHONE (OUTPATIENT)
Dept: FAMILY MEDICINE CLINIC | Age: 67
End: 2022-04-08

## 2022-04-08 RX ORDER — BENZONATATE 200 MG/1
200 CAPSULE ORAL
Qty: 30 CAPSULE | Refills: 0 | Status: SHIPPED | OUTPATIENT
Start: 2022-04-08 | End: 2022-04-22 | Stop reason: ALTCHOICE

## 2022-04-08 NOTE — TELEPHONE ENCOUNTER
----- Message from Lebron Marie sent at 4/8/2022  9:02 AM EDT -----  Subject: Appointment Request    Reason for Call: Urgent Cough Cold    QUESTIONS  Type of Appointment? Established Patient  Reason for appointment request? No appointments available during search  Additional Information for Provider? pt is calling going on week 5 of a   cough that's turning into sinus issues. nasal drop and cough no fever   ---------------------------------------------------------------------------  --------------  CALL BACK INFO  What is the best way for the office to contact you? OK to leave message on   voicemail  Preferred Call Back Phone Number? [phone]  ---------------------------------------------------------------------------  --------------  SCRIPT ANSWERS  Relationship to Patient? Self  Are you currently unable to finish sentences due to any difficulty   breathing? No  Are you unable to swallow liquids? No  Are you having fevers (100.4 or greater), chills, or sweats? No  Do you have COPD, asthma or a chronic lung condition? No  Have your symptoms been present for more than 5 days? Yes   Have you been diagnosed with, awaiting test results for, or told that you   are suspected of having COVID-19 (Coronavirus)? (If patient has tested   negative or was tested as a requirement for work, school, or travel and   not based on symptoms, answer no)? No  Within the past 10 days have you developed any of the following symptoms   (answer no if symptoms have been present longer than 10 days or began   more than 10 days ago)? Fever or Chills, Cough, Shortness of breath or   difficulty breathing, Loss of taste or smell, Sore throat, Nasal   congestion, Sneezing or runny nose, Fatigue or generalized body aches   (answer no if pain is specific to a body part e.g. back pain), Diarrhea,   Headache?  Yes

## 2022-04-13 ENCOUNTER — VIRTUAL VISIT (OUTPATIENT)
Dept: FAMILY MEDICINE CLINIC | Age: 67
End: 2022-04-13
Payer: MEDICARE

## 2022-04-13 DIAGNOSIS — R05.3 POST-COVID CHRONIC COUGH: Primary | ICD-10-CM

## 2022-04-13 DIAGNOSIS — U09.9 POST-COVID CHRONIC COUGH: Primary | ICD-10-CM

## 2022-04-13 DIAGNOSIS — J30.89 ENVIRONMENTAL AND SEASONAL ALLERGIES: ICD-10-CM

## 2022-04-13 PROCEDURE — G8432 DEP SCR NOT DOC, RNG: HCPCS | Performed by: FAMILY MEDICINE

## 2022-04-13 PROCEDURE — 3017F COLORECTAL CA SCREEN DOC REV: CPT | Performed by: FAMILY MEDICINE

## 2022-04-13 PROCEDURE — 99214 OFFICE O/P EST MOD 30 MIN: CPT | Performed by: FAMILY MEDICINE

## 2022-04-13 PROCEDURE — G8756 NO BP MEASURE DOC: HCPCS | Performed by: FAMILY MEDICINE

## 2022-04-13 PROCEDURE — G9899 SCRN MAM PERF RSLTS DOC: HCPCS | Performed by: FAMILY MEDICINE

## 2022-04-13 PROCEDURE — 1090F PRES/ABSN URINE INCON ASSESS: CPT | Performed by: FAMILY MEDICINE

## 2022-04-13 PROCEDURE — G8427 DOCREV CUR MEDS BY ELIG CLIN: HCPCS | Performed by: FAMILY MEDICINE

## 2022-04-13 PROCEDURE — 1101F PT FALLS ASSESS-DOCD LE1/YR: CPT | Performed by: FAMILY MEDICINE

## 2022-04-13 PROCEDURE — G8399 PT W/DXA RESULTS DOCUMENT: HCPCS | Performed by: FAMILY MEDICINE

## 2022-04-13 RX ORDER — AZELASTINE 1 MG/ML
1 SPRAY, METERED NASAL 2 TIMES DAILY
Qty: 1 EACH | Refills: 1 | Status: SHIPPED | OUTPATIENT
Start: 2022-04-13

## 2022-04-13 RX ORDER — ALBUTEROL SULFATE 90 UG/1
2 AEROSOL, METERED RESPIRATORY (INHALATION)
Qty: 2 EACH | Refills: 1 | Status: SHIPPED | OUTPATIENT
Start: 2022-04-13 | End: 2022-04-29

## 2022-04-13 RX ORDER — FLUTICASONE PROPIONATE AND SALMETEROL 250; 50 UG/1; UG/1
1 POWDER RESPIRATORY (INHALATION) EVERY 12 HOURS
Qty: 60 EACH | Refills: 1 | Status: SHIPPED | OUTPATIENT
Start: 2022-04-13

## 2022-04-13 NOTE — PROGRESS NOTES
Papi Ervin is a 73 y.o. female who was seen by synchronous (real-time) audio-video technology on 4/13/2022. Consent: Papi Ervin, who was seen by synchronous (real-time) audio-video technology, and/or her healthcare decision maker, is aware that this patient-initiated, Telehealth encounter on 4/13/2022 is a billable service, with coverage as determined by her insurance carrier. She is aware that she may receive a bill and has provided verbal consent to proceed: Yes. Assessment & Plan:       ICD-10-CM ICD-9-CM    1. Post-COVID chronic cough  R05.3 786.2 azelastine (ASTELIN) 137 mcg (0.1 %) nasal spray    U09.9 139.8 fluticasone propion-salmeteroL (ADVAIR/WIXELA) 250-50 mcg/dose diskus inhaler      albuterol (PROVENTIL HFA, VENTOLIN HFA, PROAIR HFA) 90 mcg/actuation inhaler   2. Environmental and seasonal allergies  J30.89 477.8 azelastine (ASTELIN) 137 mcg (0.1 %) nasal spray      albuterol (PROVENTIL HFA, VENTOLIN HFA, PROAIR HFA) 90 mcg/actuation inhaler     advair + prn alb  Please rinse mouth/brush after advair  Add astelin  Recheck in person if not improving  Recommend consideration of pulm eval          Pt was counseled on risks, benefits and alternatives of treatment options. All questions were asked and answered and the patient was agreeable with the treatment plan as outlined.       Subjective:   Papi Ervin is a 73 y.o. female who was seen for Cough (x5-6 weeks, started dry but now nasal drainage x2 weeks ( pt had COVID in January) )      No more pain with deep inspiration  Pressure around nose  She's having post nasal drip  She has cough that is ongoing since January now  She has not gotten better    She was coughing before when she was cleaning bathtubs, was thinking it was caustic inhaled fumes    Keeps mucinex on board, it helps some    It's dry, tight, made worse with expiration, laugh    Medications, allergies, PMH, PSH, SOCH, [facility] reviewed and updated per routine protocol, see chart for review and changes if not noted here. ROS  A 12 point review of systems was negative except as noted here or in the HPI. Objective:   Vital Signs: (As obtained by patient/caregiver at home)  Patient-Reported Vitals 4/13/2022   Patient-Reported Weight 138lbs   Patient-Reported Pulse 77   Patient-Reported Systolic  015   Patient-Reported Diastolic 59        [INSTRUCTIONS:  \"[x]\" Indicates a positive item  \"[]\" Indicates a negative item  -- DELETE ALL ITEMS NOT EXAMINED]    Constitutional: [x] Appears well-developed and well-nourished [x] No apparent distress      [] Abnormal -     Mental status: [x] Alert and awake  [x] Oriented to person/place/time [x] Able to follow commands    [] Abnormal -     Eyes:   EOM    [x]  Normal    [] Abnormal -   Sclera  [x]  Normal    [] Abnormal -          Discharge [x]  None visible   [] Abnormal -     HENT: [x] Normocephalic, atraumatic  [] Abnormal -   [x] Mouth/Throat: Mucous membranes are moist    External Ears [x] Normal  [] Abnormal -    Neck: [x] No visualized mass [] Abnormal -     Pulmonary/Chest: [x] Respiratory effort normal   [x] No visualized signs of difficulty breathing or respiratory distress        [] Abnormal -      Musculoskeletal:   [] Normal gait with no signs of ataxia         [x] Normal range of motion of neck        [] Abnormal -     Neurological:        [x] No Facial Asymmetry (Cranial nerve 7 motor function) (limited exam due to video visit)          [x] No gaze palsy        [] Abnormal -          Skin:        [x] No significant exanthematous lesions or discoloration noted on facial skin         [] Abnormal -            Psychiatric:       [x] Normal Affect [] Abnormal -        [x] No Hallucinations    Other pertinent observable physical exam findings:seated no distress, tight cough with laughing and speaking    We discussed the expected course, resolution and complications of the diagnosis(es) in detail.   Medication risks, benefits, costs, interactions, and alternatives were discussed as indicated. I advised her to contact the office if her condition worsens, changes or fails to improve as anticipated. She expressed understanding with the diagnosis(es) and plan. Ronda Briscoe is a 73 y.o. female who was evaluated by a video visit encounter for concerns as above. Patient identification was verified prior to start of the visit. A caregiver was present when appropriate. Due to this being a TeleHealth encounter (During YFGRX-00 public health emergency), evaluation of the following organ systems was limited: Vitals/Constitutional/EENT/Resp/CV/GI//MS/Neuro/Skin/Heme-Lymph-Imm. Pursuant to the emergency declaration under the [facility], 1135 waiver authority and the [employer] Act, this Virtual  Visit was conducted, with patient's (and/or legal guardian's) consent, to reduce the patient's risk of exposure to COVID-19 and provide necessary medical care. Services were provided through a video synchronous discussion virtually to substitute for in-person clinic visit. Patient and provider were located at their individual homes. Rafael Guevara MD  [facility]  04/13/22 3:28 PM     Portions of this note may have been populated using smart dictation software and may have \"sounds-like\" errors present.

## 2022-04-13 NOTE — PROGRESS NOTES
Chief Complaint   Patient presents with    Cough     x5-6 weeks, started dry but now nasal drainage x2 weeks ( pt had COVID in January)

## 2022-04-20 ENCOUNTER — TELEPHONE (OUTPATIENT)
Dept: FAMILY MEDICINE CLINIC | Age: 67
End: 2022-04-20

## 2022-04-20 NOTE — TELEPHONE ENCOUNTER
Non productive cough no cp thinks it is secondary to covid/flu. Pt has been seen for this, as well as, has rx.   Apt book for Friday 0800

## 2022-04-20 NOTE — TELEPHONE ENCOUNTER
Pt states she is still coughing. And reporting having night sweats as well. States she is using the inhalers prescribed by Dr Brendon Soares at last vv visit and was told to call back to be seen if not improved. No in-office appts available for weeks.    Please call [phone]

## 2022-04-22 ENCOUNTER — HOSPITAL ENCOUNTER (OUTPATIENT)
Dept: GENERAL RADIOLOGY | Age: 67
Discharge: HOME | End: 2022-04-22
Payer: MEDICARE

## 2022-04-22 ENCOUNTER — TELEPHONE (OUTPATIENT)
Dept: FAMILY MEDICINE CLINIC | Age: 67
End: 2022-04-22

## 2022-04-22 ENCOUNTER — OFFICE VISIT (OUTPATIENT)
Dept: FAMILY MEDICINE CLINIC | Age: 67
End: 2022-04-22
Payer: MEDICARE

## 2022-04-22 VITALS
HEIGHT: 64 IN | BODY MASS INDEX: 23.39 KG/M2 | DIASTOLIC BLOOD PRESSURE: 65 MMHG | RESPIRATION RATE: 16 BRPM | HEART RATE: 90 BPM | TEMPERATURE: 97.2 F | OXYGEN SATURATION: 98 % | WEIGHT: 137 LBS | SYSTOLIC BLOOD PRESSURE: 134 MMHG

## 2022-04-22 DIAGNOSIS — U09.9 POST-COVID CHRONIC COUGH: ICD-10-CM

## 2022-04-22 DIAGNOSIS — R05.3 POST-COVID CHRONIC COUGH: ICD-10-CM

## 2022-04-22 DIAGNOSIS — J18.9 PNEUMONIA OF RIGHT UPPER LOBE DUE TO INFECTIOUS ORGANISM: ICD-10-CM

## 2022-04-22 DIAGNOSIS — U09.9 POST-COVID CHRONIC COUGH: Primary | ICD-10-CM

## 2022-04-22 DIAGNOSIS — R05.3 POST-COVID CHRONIC COUGH: Primary | ICD-10-CM

## 2022-04-22 DIAGNOSIS — R93.89 ABNORMAL CHEST X-RAY: ICD-10-CM

## 2022-04-22 PROCEDURE — 1090F PRES/ABSN URINE INCON ASSESS: CPT | Performed by: FAMILY MEDICINE

## 2022-04-22 PROCEDURE — G8399 PT W/DXA RESULTS DOCUMENT: HCPCS | Performed by: FAMILY MEDICINE

## 2022-04-22 PROCEDURE — G8432 DEP SCR NOT DOC, RNG: HCPCS | Performed by: FAMILY MEDICINE

## 2022-04-22 PROCEDURE — G8427 DOCREV CUR MEDS BY ELIG CLIN: HCPCS | Performed by: FAMILY MEDICINE

## 2022-04-22 PROCEDURE — 71046 X-RAY EXAM CHEST 2 VIEWS: CPT

## 2022-04-22 PROCEDURE — 1101F PT FALLS ASSESS-DOCD LE1/YR: CPT | Performed by: FAMILY MEDICINE

## 2022-04-22 PROCEDURE — G8752 SYS BP LESS 140: HCPCS | Performed by: FAMILY MEDICINE

## 2022-04-22 PROCEDURE — G9899 SCRN MAM PERF RSLTS DOC: HCPCS | Performed by: FAMILY MEDICINE

## 2022-04-22 PROCEDURE — G8754 DIAS BP LESS 90: HCPCS | Performed by: FAMILY MEDICINE

## 2022-04-22 PROCEDURE — G8420 CALC BMI NORM PARAMETERS: HCPCS | Performed by: FAMILY MEDICINE

## 2022-04-22 PROCEDURE — 3017F COLORECTAL CA SCREEN DOC REV: CPT | Performed by: FAMILY MEDICINE

## 2022-04-22 PROCEDURE — 99214 OFFICE O/P EST MOD 30 MIN: CPT | Performed by: FAMILY MEDICINE

## 2022-04-22 PROCEDURE — G8536 NO DOC ELDER MAL SCRN: HCPCS | Performed by: FAMILY MEDICINE

## 2022-04-22 RX ORDER — MONTELUKAST SODIUM 10 MG/1
10 TABLET ORAL
Qty: 30 TABLET | Refills: 1 | Status: SHIPPED | OUTPATIENT
Start: 2022-04-22 | End: 2022-04-29

## 2022-04-22 RX ORDER — PREDNISONE 20 MG/1
40 TABLET ORAL
Qty: 10 TABLET | Refills: 0 | Status: SHIPPED | OUTPATIENT
Start: 2022-04-22 | End: 2022-04-27

## 2022-04-22 NOTE — PATIENT INSTRUCTIONS
Post covid cough in setting of allergy season  Pt does not have a robust hx of seasonal allergies  Cough ongoing, seems to respond some to DM not to albuterol, very mildly to advair    Add singulair  C/w astelin  C/w advair    pred can start as burst in am if not better or improving in 3-5 days    xr today    pulm referral    If xr normal, can consider chest CT to ensure not post covid intrinsic lung damage

## 2022-04-22 NOTE — PROGRESS NOTES
Family Medicine Follow-Up Progress Note  Patient: Marquise Hyde  1955, 73 y.o., female  Encounter Date: 4/22/2022    ASSESSMENT & PLAN    ICD-10-CM ICD-9-CM    1. Post-COVID chronic cough  R05.3 786.2 XR CHEST PA LAT    U09.9 139.8 REFERRAL TO PULMONARY DISEASE      montelukast (Singulair) 10 mg tablet      predniSONE (DELTASONE) 20 mg tablet       Orders Placed This Encounter    XR CHEST PA LAT     Standing Status:   Future     Standing Expiration Date:   10/23/2022   Kartik Garcia Pulmonary [facility]     Referral Priority:   Routine     Referral Type:   Consultation     Referral Reason:   Specialty Services Required     Referred to Provider:   Maria Guadalupe Diaz MD     Number of Visits Requested:   1    montelukast (Singulair) 10 mg tablet     Sig: Take 1 Tablet by mouth nightly. Dispense:  30 Tablet     Refill:  1    predniSONE (DELTASONE) 20 mg tablet     Sig: Take 40 mg by mouth daily (with breakfast) for 5 days.      Dispense:  10 Tablet     Refill:  0       Patient Instructions   Post covid cough in setting of allergy season  Pt does not have a robust hx of seasonal allergies  Cough ongoing, seems to respond some to DM not to albuterol, very mildly to advair    Add singulair  C/w astelin  C/w advair    pred can start as burst in am if not better or improving in 3-5 days    xr today    pulm referral    If xr normal, can consider chest CT to ensure not post covid intrinsic lung damage       CHIEF COMPLAINT  Chief Complaint   Patient presents with    Cough     cough since feb nonproductive       SUBJECTIVE  Marquise Hyde is a 73 y.o. female presenting today for follow up  Rescue inhaler (alb) has NOT made a single difference  Advair maybe gives her an hour of peace, that's low yield    Cough now is mostly dry, in the morning and at night it is more notably productive of post nasal mucous     She does get paroxysms occasionally where she can't catch her breath    Tessalon did not help    She did start the astelin, she isn't seeing a big difference, but she does have less mucous production during the day    She IS waking up at night coughing, but last night she took mucinex DM at 11 and made it to 6am without waking up coughing    Lying flat is a worsening trigger    She has a 25 lbs dog who is older and she carries him up at night time, she is winded from that but outside of that type of activity no SOB on exertion      ROS  Review of Systems  A 12 point review of systems was negative except as noted here or in the HPI. OBJECTIVE  Visit Vitals  /65   Pulse 90   Temp 97.2 °F (36.2 °C) (Temporal)   Resp 16   Ht 5' 4\" (1.626 m)   Wt 137 lb (62.1 kg)   LMP  (LMP Unknown)   SpO2 98%   BMI 23.52 kg/m²       Physical Exam  Vitals and nursing note reviewed. Constitutional:       General: She is not in acute distress. Appearance: Normal appearance. She is normal weight. She is not ill-appearing, toxic-appearing or diaphoretic. HENT:      Head: Normocephalic and atraumatic. Right Ear: External ear normal.      Left Ear: External ear normal.      Ears:      Comments: Bilateral effusion with mild opacification of TM     Mouth/Throat:      Mouth: Mucous membranes are moist.   Eyes:      General: No scleral icterus. Right eye: No discharge. Left eye: No discharge. Comments: eom grossly intact   Neck:      Comments: No visible neck masses , ROM appears normal from visual inspection  Cardiovascular:      Rate and Rhythm: Normal rate and regular rhythm. Heart sounds: No friction rub. No gallop. Pulmonary:      Effort: Pulmonary effort is normal. No respiratory distress. Breath sounds: No stridor. No wheezing, rhonchi or rales. Comments: Some mucous plugging//coarseness appreciated in MARYAM and RLL  Abdominal:      General: Bowel sounds are normal.      Palpations: Abdomen is soft. Musculoskeletal:      Right lower leg: No edema. Left lower leg: No edema. Skin:     General: Skin is warm and dry. Comments: Visible skin is without jaundice, bruising, lesion, pallor, erythema or rash except as otherwise noted   Neurological:      General: No focal deficit present. Mental Status: She is alert and oriented to person, place, and time. Psychiatric:         Mood and Affect: Mood normal.         Behavior: Behavior normal.         Thought Content: Thought content normal.         Judgment: Judgment normal.         No results found for any visits on 04/22/22. HISTORICAL  Reviewed and updated today, and as noted below:    Past Medical History:   Diagnosis Date    Hypertension     Pyodermic gangrenosum     Ulcerative colitis      Past Surgical History:   Procedure Laterality Date    COLONOSCOPY N/A 11/18/2016    COLONOSCOPY performed by Darrick Giron MD at [facility] ENDOSCOPY    COLONOSCOPY N/A 12/4/2018    COLONOSCOPY performed by Shyla Viramontes MD at [facility] ENDOSCOPY    COLONOSCOPY N/A 1/22/2021    COLONOSCOPY  :- performed by Shyla Viramontes MD at [facility] ENDOSCOPY    HX COLONOSCOPY  2014    HX HYSTERECTOMY       Family History   Problem Relation Age of Onset    Hypertension Mother     Cancer Father         lung cancer     Social History     Tobacco Use   Smoking Status Never Smoker   Smokeless Tobacco Never Used     Social History     Socioeconomic History    Marital status:    Tobacco Use    Smoking status: Never Smoker    Smokeless tobacco: Never Used   Vaping Use    Vaping Use: Never used   Substance and Sexual Activity    Alcohol use: Yes     Comment: rare    Sexual activity: Yes     Partners: Male     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      Allergies   Allergen Reactions    Ace Inhibitors Cough       LAB REVIEW  Lab Results   Component Value Date/Time    Sodium 138 09/07/2021 04:21 PM    Potassium 4.1 09/07/2021 04:21 PM    Chloride 104 09/07/2021 04:21 PM    CO2 29 09/07/2021 04:21 PM    Anion gap 5 09/07/2021 04:21 PM    Glucose 90 09/07/2021 04:21 PM    BUN 14 09/07/2021 04:21 PM    Creatinine 0.57 09/07/2021 04:21 PM    BUN/Creatinine ratio 25 (H) 09/07/2021 04:21 PM    GFR est AA >60 09/07/2021 04:21 PM    GFR est non-AA >60 09/07/2021 04:21 PM    Calcium 9.2 09/07/2021 04:21 PM    Bilirubin, total 0.6 09/07/2021 04:21 PM    Alk. phosphatase 83 09/07/2021 04:21 PM    Protein, total 7.8 09/07/2021 04:21 PM    Albumin 4.3 09/07/2021 04:21 PM    Globulin 3.5 09/07/2021 04:21 PM    A-G Ratio 1.2 09/07/2021 04:21 PM    ALT (SGPT) 32 09/07/2021 04:21 PM     Lab Results   Component Value Date/Time    WBC 6.1 09/07/2021 04:21 PM    HGB 10.4 (L) 09/07/2021 04:21 PM    HCT 33.9 (L) 09/07/2021 04:21 PM    PLATELET 690 42/98/3562 04:21 PM    .5 (H) 09/07/2021 04:21 PM     No results found for: HBA1C, QHM5ZHTY, JQN8KCPC, JCV2UHEQ  Lab Results   Component Value Date/Time    Cholesterol, total 194 09/07/2021 04:21 PM    HDL Cholesterol 57 09/07/2021 04:21 PM    LDL, calculated 100.8 (H) 09/07/2021 04:21 PM    VLDL, calculated 36.2 09/07/2021 04:21 PM    Triglyceride 181 (H) 09/07/2021 04:21 PM    CHOL/HDL Ratio 3.4 09/07/2021 04:21 PM           Machelle Dalal MD  [facility]  04/22/22 8:14 AM    Portions of this note may have been populated using smart dictation software and may have \"sounds-like\" errors present. Pt was counseled on risks, benefits and alternatives of treatment options. All questions were asked and answered and the patient was agreeable with the treatment plan as outlined.

## 2022-04-29 ENCOUNTER — HOSPITAL ENCOUNTER (OUTPATIENT)
Dept: CT IMAGING | Age: 67
Discharge: HOME | End: 2022-04-29
Attending: FAMILY MEDICINE
Payer: MEDICARE

## 2022-04-29 DIAGNOSIS — U09.9 POST-COVID CHRONIC COUGH: ICD-10-CM

## 2022-04-29 DIAGNOSIS — R93.89 ABNORMAL CHEST X-RAY: ICD-10-CM

## 2022-04-29 DIAGNOSIS — R05.3 POST-COVID CHRONIC COUGH: ICD-10-CM

## 2022-04-29 PROCEDURE — 71250 CT THORAX DX C-: CPT

## 2022-04-29 RX ORDER — AZITHROMYCIN 250 MG/1
TABLET, FILM COATED ORAL
Qty: 6 TABLET | Refills: 0 | Status: SHIPPED | OUTPATIENT
Start: 2022-04-29 | End: 2022-05-04

## 2022-04-29 RX ORDER — AMOXICILLIN AND CLAVULANATE POTASSIUM 875; 125 MG/1; MG/1
1 TABLET, FILM COATED ORAL 2 TIMES DAILY
Qty: 20 TABLET | Refills: 0 | Status: SHIPPED | OUTPATIENT
Start: 2022-04-29 | End: 2022-05-09

## 2022-05-11 ENCOUNTER — TRANSCRIBE ORDER (OUTPATIENT)
Dept: SCHEDULING | Age: 67
End: 2022-05-11

## 2022-05-11 DIAGNOSIS — J18.9 PNEUMONIA: Primary | ICD-10-CM

## 2022-06-21 ENCOUNTER — HOSPITAL ENCOUNTER (OUTPATIENT)
Dept: CT IMAGING | Age: 67
Discharge: HOME | End: 2022-06-21
Attending: INTERNAL MEDICINE
Payer: MEDICARE

## 2022-06-21 DIAGNOSIS — J18.9 PNEUMONIA: ICD-10-CM

## 2022-06-21 LAB — CREAT BLD-MCNC: 0.5 MG/DL (ref 0.6–1.3)

## 2022-06-21 PROCEDURE — 74011000636 HC RX REV CODE- 636: Performed by: RADIOLOGY

## 2022-06-21 PROCEDURE — 71260 CT THORAX DX C+: CPT

## 2022-06-21 PROCEDURE — 82565 ASSAY OF CREATININE: CPT

## 2022-06-21 RX ADMIN — IOPAMIDOL 100 ML: 612 INJECTION, SOLUTION INTRAVENOUS at 09:02

## 2022-08-14 DIAGNOSIS — I10 ESSENTIAL HYPERTENSION: ICD-10-CM

## 2022-08-16 RX ORDER — LOSARTAN POTASSIUM AND HYDROCHLOROTHIAZIDE 12.5; 5 MG/1; MG/1
TABLET ORAL
Qty: 90 TABLET | Refills: 1 | Status: SHIPPED | OUTPATIENT
Start: 2022-08-16

## 2022-09-13 ENCOUNTER — TELEPHONE (OUTPATIENT)
Dept: FAMILY MEDICINE CLINIC | Age: 67
End: 2022-09-13

## 2022-09-13 ENCOUNTER — PATIENT MESSAGE (OUTPATIENT)
Dept: FAMILY MEDICINE CLINIC | Age: 67
End: 2022-09-13

## 2022-09-13 DIAGNOSIS — Z12.31 SCREENING MAMMOGRAM FOR BREAST CANCER: Primary | ICD-10-CM

## 2022-09-13 NOTE — TELEPHONE ENCOUNTER
----- Message from [address] Kolby Mitchell sent at 9/13/2022  9:45 AM EDT -----  Regarding: Mammogram  I am due for a mammogram in October. I dont see you until January.   Would you give me a referral for a  mammogram so I can go ahead and schedule my mammogram?

## 2022-10-13 ENCOUNTER — HOSPITAL ENCOUNTER (OUTPATIENT)
Dept: MAMMOGRAPHY | Age: 67
Discharge: HOME | End: 2022-10-13
Attending: FAMILY MEDICINE
Payer: MEDICARE

## 2022-10-13 DIAGNOSIS — Z12.31 SCREENING MAMMOGRAM FOR BREAST CANCER: ICD-10-CM

## 2022-10-13 PROCEDURE — 77063 BREAST TOMOSYNTHESIS BI: CPT

## 2023-01-12 ENCOUNTER — OFFICE VISIT (OUTPATIENT)
Dept: FAMILY MEDICINE CLINIC | Age: 68
End: 2023-01-12
Payer: MEDICARE

## 2023-01-12 VITALS
RESPIRATION RATE: 16 BRPM | SYSTOLIC BLOOD PRESSURE: 169 MMHG | HEIGHT: 64 IN | DIASTOLIC BLOOD PRESSURE: 74 MMHG | BODY MASS INDEX: 24.12 KG/M2 | OXYGEN SATURATION: 96 % | HEART RATE: 76 BPM | TEMPERATURE: 97.2 F | WEIGHT: 141.3 LBS

## 2023-01-12 DIAGNOSIS — K51.30 ULCERATIVE RECTOSIGMOIDITIS WITHOUT COMPLICATION (HCC): ICD-10-CM

## 2023-01-12 DIAGNOSIS — I10 ESSENTIAL HYPERTENSION: ICD-10-CM

## 2023-01-12 DIAGNOSIS — E78.49 OTHER HYPERLIPIDEMIA: ICD-10-CM

## 2023-01-12 DIAGNOSIS — D53.9 MACROCYTIC ANEMIA: ICD-10-CM

## 2023-01-12 DIAGNOSIS — Z78.0 POSTMENOPAUSAL: ICD-10-CM

## 2023-01-12 DIAGNOSIS — M65.30 TRIGGER FINGER, UNSPECIFIED FINGER, UNSPECIFIED LATERALITY: ICD-10-CM

## 2023-01-12 DIAGNOSIS — M85.80 OSTEOPENIA, UNSPECIFIED LOCATION: ICD-10-CM

## 2023-01-12 DIAGNOSIS — Z00.00 MEDICARE ANNUAL WELLNESS VISIT, SUBSEQUENT: Primary | ICD-10-CM

## 2023-01-12 DIAGNOSIS — M65.4 DE QUERVAIN'S TENOSYNOVITIS, RIGHT: ICD-10-CM

## 2023-01-12 DIAGNOSIS — I73.9 PERIPHERAL VASCULAR DISEASE (HCC): ICD-10-CM

## 2023-01-12 DIAGNOSIS — Z71.89 ADVANCED DIRECTIVES, COUNSELING/DISCUSSION: ICD-10-CM

## 2023-01-12 DIAGNOSIS — I10 PRIMARY HYPERTENSION: ICD-10-CM

## 2023-01-12 PROCEDURE — G8427 DOCREV CUR MEDS BY ELIG CLIN: HCPCS | Performed by: FAMILY MEDICINE

## 2023-01-12 PROCEDURE — G8399 PT W/DXA RESULTS DOCUMENT: HCPCS | Performed by: FAMILY MEDICINE

## 2023-01-12 PROCEDURE — G0439 PPPS, SUBSEQ VISIT: HCPCS | Performed by: FAMILY MEDICINE

## 2023-01-12 PROCEDURE — G8510 SCR DEP NEG, NO PLAN REQD: HCPCS | Performed by: FAMILY MEDICINE

## 2023-01-12 PROCEDURE — 3077F SYST BP >= 140 MM HG: CPT | Performed by: FAMILY MEDICINE

## 2023-01-12 PROCEDURE — 99497 ADVNCD CARE PLAN 30 MIN: CPT | Performed by: FAMILY MEDICINE

## 2023-01-12 PROCEDURE — 1123F ACP DISCUSS/DSCN MKR DOCD: CPT | Performed by: FAMILY MEDICINE

## 2023-01-12 PROCEDURE — G8420 CALC BMI NORM PARAMETERS: HCPCS | Performed by: FAMILY MEDICINE

## 2023-01-12 PROCEDURE — 3017F COLORECTAL CA SCREEN DOC REV: CPT | Performed by: FAMILY MEDICINE

## 2023-01-12 PROCEDURE — G8536 NO DOC ELDER MAL SCRN: HCPCS | Performed by: FAMILY MEDICINE

## 2023-01-12 PROCEDURE — 99215 OFFICE O/P EST HI 40 MIN: CPT | Performed by: FAMILY MEDICINE

## 2023-01-12 PROCEDURE — G9899 SCRN MAM PERF RSLTS DOC: HCPCS | Performed by: FAMILY MEDICINE

## 2023-01-12 PROCEDURE — 1101F PT FALLS ASSESS-DOCD LE1/YR: CPT | Performed by: FAMILY MEDICINE

## 2023-01-12 PROCEDURE — 1090F PRES/ABSN URINE INCON ASSESS: CPT | Performed by: FAMILY MEDICINE

## 2023-01-12 PROCEDURE — 3078F DIAST BP <80 MM HG: CPT | Performed by: FAMILY MEDICINE

## 2023-01-12 RX ORDER — LOSARTAN POTASSIUM AND HYDROCHLOROTHIAZIDE 25; 100 MG/1; MG/1
1 TABLET ORAL DAILY
Qty: 90 TABLET | Refills: 1 | Status: SHIPPED | OUTPATIENT
Start: 2023-01-12

## 2023-01-12 NOTE — PATIENT INSTRUCTIONS
Medicare Wellness Visit, Female     The best way to live healthy is to have a lifestyle where you eat a well-balanced diet, exercise regularly, limit alcohol use, and quit all forms of tobacco/nicotine, if applicable. Regular preventive services are another way to keep healthy. Preventive services (vaccines, screening tests, monitoring & exams) can help personalize your care plan, which helps you manage your own care. Screening tests can find health problems at the earliest stages, when they are easiest to treat. [facility] follows the current, evidence-based guidelines published by the [facility] ([facility]) when recommending preventive services for our patients. Because we follow these guidelines, sometimes recommendations change over time as research supports it. (For example, mammograms used to be recommended annually. Even though Medicare will still pay for an annual mammogram, the newer guidelines recommend a mammogram every two years for women of average risk). Of course, you and your doctor may decide to screen more often for some diseases, based on your risk and your co-morbidities (chronic disease you are already diagnosed with). Preventive services for you include:  - Medicare offers their members a free annual wellness visit, which is time for you and your primary care provider to discuss and plan for your preventive service needs.  Take advantage of this benefit every year!    -Over the age of 72 should receive the recommended pneumonia vaccines.    -All adults should have a flu vaccine yearly.  -All adults should have a tetanus vaccine every 10 years.   -Over the age 48 should receive the shingles vaccines.        -All adults should be screened once for Hepatitis C.  -All adults age 38-68 who are overweight should have a diabetes screening test once every three years.   -Other screening tests and preventive services for persons with diabetes include: an eye exam to screen for diabetic retinopathy, a kidney function test, a foot exam, and stricter control over your cholesterol.   -Cardiovascular screening for adults with routine risk involves an electrocardiogram (ECG) at intervals determined by your doctor.     -Colorectal cancer screenings should be done for adults age 39-70 with no increased risk factors for colorectal cancer. There are a number of acceptable methods of screening for this type of cancer. Each test has its own benefits and drawbacks. Discuss with your doctor what is most appropriate for you during your annual wellness visit. The different tests include: colonoscopy (considered the best screening method), a fecal occult blood test, a fecal DNA test, and sigmoidoscopy.    -Lung cancer screening is recommended annually with a low dose CT scan for adults between age 54 and 68, who have smoked at least 30 pack years (equivalent of 1 pack per day for 30 days), and who is a current smoker or quit less than 15 years ago.    -A bone mass density test is recommended when a woman turns 65 to screen for osteoporosis. This test is only recommended one time, as a screening. Some providers will use this same test as a disease monitoring tool if you already have osteoporosis. -Breast cancer screenings are recommended every other year for women of normal risk, age 54-69.    -Cervical cancer screenings for women over age 72 are only recommended with certain risk factors.      Here is a list of your current Health Maintenance items (your personalized list of preventive services) with a due date:  Health Maintenance Due   Topic Date Due    COVID-19 Vaccine (4 - Booster for Elias Scrape series) 12/28/2021    Colorectal Screening  01/22/2023         Fu ortho for Dq, take all nsaids with food  GI running show for UC, will c/w sulfasalazine and have upcoming cscope  PVD stable  Trigger finger per ortho also  Bp nto at goal, increase med , check at home, recheck in office , labs per my orders    Dexa due November  Anemai may have been reactive, recheck, but hx of uc, need to presume some ammount of chronic blood loss anemia as source also  Lipids fasting today

## 2023-01-12 NOTE — PROGRESS NOTES
This is the Subsequent Medicare Annual Wellness Exam, performed 12 months or more after the Initial AWV or the last Subsequent AWV    I have reviewed the patient's medical history in detail and updated the computerized patient record. Assessment/Plan   Education and counseling provided:  Are appropriate based on today's review and evaluation    1. Medicare annual wellness visit, subsequent  2. Advanced directives, counseling/discussion  -     ADVANCE CARE PLANNING FIRST 30 MINS  -     FULL CODE  3. De Quervain's tenosynovitis, right  4. Ulcerative rectosigmoiditis without complication ([facility]  -     FERRITIN; Future  5. Peripheral vascular disease ([facility] [address] [facility]  6. Trigger finger, unspecified finger, unspecified laterality  7. Essential hypertension  8. Postmenopausal  -     DEXA BONE DENSITY STUDY AXIAL; Future  9. Osteopenia, unspecified location  -     DEXA BONE DENSITY STUDY AXIAL; Future  10. Primary hypertension  -     METABOLIC PANEL, COMPREHENSIVE; Future  -     LIPID PANEL; Future  -     losartan-hydroCHLOROthiazide (HYZAAR) 100-25 mg per tablet; Take 1 Tablet by mouth daily. , Normal, Disp-90 Tablet, R-1  -     CBC WITH AUTOMATED DIFF; Future  11. Macrocytic anemia  -     VITAMIN B12; Future  -     FOLATE; Future  -     CBC WITH AUTOMATED DIFF; Future  12. Other hyperlipidemia  -     METABOLIC PANEL, COMPREHENSIVE; Future  -     LIPID PANEL; Future       Depression Risk Factor Screening     3 most recent PHQ Screens 1/12/2023   Little interest or pleasure in doing things Not at all   Feeling down, depressed, irritable, or hopeless Not at all   Total Score PHQ 2 0       Alcohol & Drug Abuse Risk Screen    Do you average more than 1 drink per night or more than 7 drinks a week:  No    On any one occasion in the past three months have you have had more than 3 drinks containing alcohol:  No          Functional Ability and Level of Safety    Hearing: Hearing is good. Activities of Daily Living:   The home contains: handrails  Patient does total self care      Ambulation: with no difficulty     Fall Risk:  Fall Risk Assessment, last 12 mths 4/13/2022   Able to walk? Yes   Fall in past 12 months? 0   Do you feel unsteady? 0   Are you worried about falling 0      Abuse Screen:  Patient is not abused       Cognitive Screening    Has your family/caregiver stated any concerns about your memory: no     Cognitive Screening: Normal - Verbal Fluency Test    Health Maintenance Due     Health Maintenance Due   Topic Date Due    Colorectal Cancer Screening Combo  01/22/2023       Patient Care Team   Patient Care Team:  Jonny Navarro MD as PCP - General (Family Medicine)  Jonny Navarro MD as PCP - [facility]  Bruce Noble RN as [address] [place] [address] (Internal Medicine Physician)  Cristel Solomon MD (Gastroenterology)    History     Patient Active Problem List   Diagnosis Code    Hypertension I10    UC (ulcerative colitis) ([facility] [address] K51.90    Pyoderma gangrenosa L88    Peripheral vascular disease ([facility] [address] I73.9     Past Medical History:   Diagnosis Date    Hypertension     Pyodermic gangrenosum     Ulcerative colitis       Past Surgical History:   Procedure Laterality Date    COLONOSCOPY N/A 11/18/2016    COLONOSCOPY performed by Siva Simpson MD at [facility] ENDOSCOPY    COLONOSCOPY N/A 12/4/2018    COLONOSCOPY performed by Cristel Solomon MD at [facility] ENDOSCOPY    COLONOSCOPY N/A 1/22/2021    COLONOSCOPY  :- performed by Cristel Solomon MD at [facility] ENDOSCOPY    HX COLONOSCOPY  2014    HX HYSTERECTOMY       Current Outpatient Medications   Medication Sig Dispense Refill    losartan-hydroCHLOROthiazide (HYZAAR) 100-25 mg per tablet Take 1 Tablet by mouth daily. 90 Tablet 1    ascorbic acid,sod/zinc ox,gluc (ZINC AND C PO) Take  by mouth. ascorbic acid (VITAMIN C PO) Take  by mouth. aspirin delayed-release 81 mg tablet Take  by mouth daily. folic acid (FOLVITE) 1 mg tablet Take 1 mg by mouth daily. sulfaSALAzine (AZULFIDINE) 500 mg tablet Take 500 mg by mouth two (2) times a day. Allergies   Allergen Reactions    Ace Inhibitors Cough       Family History   Problem Relation Age of Onset    Hypertension Mother     Cancer Father         lung cancer     Social History     Tobacco Use    Smoking status: Never    Smokeless tobacco: Never   Substance Use Topics    Alcohol use: Yes     Comment: cecilio Lewis MD     Family Medicine Follow-Up Progress Note  Patient: Jayne Lang  1955, 78 y.o., female  Encounter Date: 1/12/2023    ASSESSMENT & PLAN    ICD-10-CM ICD-9-CM    1. Medicare annual wellness visit, subsequent  Z00.00 V70.0       2. Advanced directives, counseling/discussion  Z71.89 V65.49 ADVANCE CARE PLANNING FIRST 30 MINS      FULL CODE      3. De Quervain's tenosynovitis, right  M65.4 727.04       4. Ulcerative rectosigmoiditis without complication (HCC)  G69.40 556.3 FERRITIN      5. Peripheral vascular disease (HCC)  I73.9 443.9       6. Trigger finger, unspecified finger, unspecified laterality  M65.30 727.03       7. Essential hypertension  I10 401.9       8. Postmenopausal  Z78.0 V49.81 DEXA BONE DENSITY STUDY AXIAL      9. Osteopenia, unspecified location  M85.80 733.90 DEXA BONE DENSITY STUDY AXIAL      10. Primary hypertension  S42 593.2 METABOLIC PANEL, COMPREHENSIVE      LIPID PANEL      losartan-hydroCHLOROthiazide (HYZAAR) 100-25 mg per tablet      CBC WITH AUTOMATED DIFF      11. Macrocytic anemia  D53.9 281.9 VITAMIN B12      FOLATE      CBC WITH AUTOMATED DIFF      CANCELED: CBC W/O DIFF      12.  Other hyperlipidemia  A32.18 571.0 METABOLIC PANEL, COMPREHENSIVE      LIPID PANEL          Orders Placed This Encounter    Order - Advanced Care Planning (16-30 minutes)    DEXA BONE DENSITY STUDY AXIAL     Standing Status:   Future     Standing Expiration Date:   7/12/2024     Scheduling Instructions:      Enrique hart 6 2023, please schedule for future    METABOLIC PANEL, COMPREHENSIVE     Standing Status:   Future     Standing Expiration Date:   1/12/2024    LIPID PANEL     Standing Status:   Future     Standing Expiration Date:   1/12/2024    VITAMIN B12     Standing Status:   Future     Standing Expiration Date:   1/12/2024    FOLATE     Standing Status:   Future     Standing Expiration Date:   1/12/2024    FERRITIN     Standing Status:   Future     Standing Expiration Date:   1/12/2024    CBC WITH AUTOMATED DIFF     Standing Status:   Future     Standing Expiration Date:   1/12/2024    FULL CODE    losartan-hydroCHLOROthiazide (HYZAAR) 100-25 mg per tablet     Sig: Take 1 Tablet by mouth daily. Dispense:  90 Tablet     Refill:  1       Patient Instructions   Medicare Wellness Visit, Female     The best way to live healthy is to have a lifestyle where you eat a well-balanced diet, exercise regularly, limit alcohol use, and quit all forms of tobacco/nicotine, if applicable. Regular preventive services are another way to keep healthy. Preventive services (vaccines, screening tests, monitoring & exams) can help personalize your care plan, which helps you manage your own care. Screening tests can find health problems at the earliest stages, when they are easiest to treat. [facility] follows the current, evidence-based guidelines published by the [facility] [employer] (USPSTF) when recommending preventive services for our patients. Because we follow these guidelines, sometimes recommendations change over time as research supports it. (For example, mammograms used to be recommended annually. Even though Medicare will still pay for an annual mammogram, the newer guidelines recommend a mammogram every two years for women of average risk). Of course, you and your doctor may decide to screen more often for some diseases, based on your risk and your co-morbidities (chronic disease you are already diagnosed with).      Preventive services for you include:  - Medicare offers their members a free annual wellness visit, which is time for you and your primary care provider to discuss and plan for your preventive service needs. Take advantage of this benefit every year!    -Over the age of 72 should receive the recommended pneumonia vaccines.    -All adults should have a flu vaccine yearly.  -All adults should have a tetanus vaccine every 10 years.   -Over the age 48 should receive the shingles vaccines.        -All adults should be screened once for Hepatitis C.  -All adults age 38-68 who are overweight should have a diabetes screening test once every three years.   -Other screening tests and preventive services for persons with diabetes include: an eye exam to screen for diabetic retinopathy, a kidney function test, a foot exam, and stricter control over your cholesterol.   -Cardiovascular screening for adults with routine risk involves an electrocardiogram (ECG) at intervals determined by your doctor.     -Colorectal cancer screenings should be done for adults age 39-70 with no increased risk factors for colorectal cancer. There are a number of acceptable methods of screening for this type of cancer. Each test has its own benefits and drawbacks. Discuss with your doctor what is most appropriate for you during your annual wellness visit. The different tests include: colonoscopy (considered the best screening method), a fecal occult blood test, a fecal DNA test, and sigmoidoscopy.    -Lung cancer screening is recommended annually with a low dose CT scan for adults between age 54 and 68, who have smoked at least 30 pack years (equivalent of 1 pack per day for 30 days), and who is a current smoker or quit less than 15 years ago.    -A bone mass density test is recommended when a woman turns 65 to screen for osteoporosis. This test is only recommended one time, as a screening.  Some providers will use this same test as a disease monitoring tool if you already have osteoporosis. -Breast cancer screenings are recommended every other year for women of normal risk, age 54-69.    -Cervical cancer screenings for women over age 72 are only recommended with certain risk factors. Here is a list of your current Health Maintenance items (your personalized list of preventive services) with a due date:  Health Maintenance Due   Topic Date Due    COVID-19 Vaccine (4 - Booster for Claudeen Fermo series) 12/28/2021    Colorectal Screening  01/22/2023         Fu ortho for Dq, take all nsaids with food  GI running show for UC, will c/w sulfasalazine and have upcoming cscope  PVD stable  Trigger finger per ortho also  Bp nto at goal, increase med , check at home, recheck in office , labs per my orders    Dexa due November  Anemai may have been reactive, recheck, but hx of uc, need to presume some ammount of chronic blood loss anemia as source also  Lipids fasting today  CHIEF COMPLAINT  Chief Complaint   Patient presents with    Complete Physical       SUBJECTIVE  Jose Graves is a 78 y.o. female presenting today for follow up    Hx of UC: is scheduled for cscope march, she is still on sulfasalazine  She had covid then she had pneumonia and the medications she was on for that really flared up her UC symptoms  Sees Dr Veronica Macario    Had Covid in January, then she had a new discrete infectious problem starting last march and she ultimately did see Dr Devika Armas  Now no long term effects notable    Behind on seeing dermatology    Eye doctor: once a year, seeing     HTN: patient reports stable on medications. No chest pain, sob, headache, blurred vision, leg swelling, diaphoresis, falls. Compliant with medications as prescribed. Is not regularly checking blood pressures at home and reports range is unknown. No significant hyper or hypotensive episodes that the patient reports today.     ROS  Review of Systems  A 12 point review of systems was negative except as noted here or in the HPI.    OBJECTIVE  Visit Vitals  BP (!) 169/74   Pulse 76   Temp 97.2 °F (36.2 °C)   Resp 16   Ht 5' 4\" (1.626 m)   Wt 141 lb 4.8 oz (64.1 kg)   LMP  (LMP Unknown)   SpO2 96%   BMI 24.25 kg/m²       Physical Exam  Vitals and nursing note reviewed. Constitutional:       General: She is not in acute distress. Appearance: Normal appearance. She is not ill-appearing, toxic-appearing or diaphoretic. HENT:      Head: Normocephalic and atraumatic. Right Ear: External ear normal.      Left Ear: External ear normal.      Nose: No congestion or rhinorrhea. Mouth/Throat:      Mouth: Mucous membranes are moist.   Eyes:      General: No scleral icterus. Right eye: No discharge. Left eye: No discharge. Comments: eom grossly intact   Neck:      Vascular: No carotid bruit. Comments: No visible neck masses , ROM appears normal from visual inspection  Cardiovascular:      Rate and Rhythm: Normal rate and regular rhythm. Pulses: Normal pulses. Heart sounds: Normal heart sounds. No murmur heard. No friction rub. No gallop. Pulmonary:      Effort: Pulmonary effort is normal. No respiratory distress. Breath sounds: No stridor. No wheezing or rhonchi. Abdominal:      General: Bowel sounds are normal. There is no distension. Palpations: Abdomen is soft. There is no mass. Tenderness: There is no abdominal tenderness. Musculoskeletal:      Right lower leg: No edema. Left lower leg: No edema. Skin:     General: Skin is warm and dry. Findings: No rash. Comments: Visible skin is without jaundice, bruising, lesion, pallor, erythema or rash except as otherwise noted   Neurological:      General: No focal deficit present. Mental Status: She is alert and oriented to person, place, and time. Psychiatric:         Mood and Affect: Mood normal.         Behavior: Behavior normal.         Thought Content:  Thought content normal.         Judgment: Judgment normal.       No results found for any visits on 01/12/23. HISTORICAL  Reviewed and updated today, and as noted below:    Past Medical History:   Diagnosis Date    Hypertension     Pyodermic gangrenosum     Ulcerative colitis      Past Surgical History:   Procedure Laterality Date    COLONOSCOPY N/A 11/18/2016    COLONOSCOPY performed by Minerva Medina MD at [facility] ENDOSCOPY    COLONOSCOPY N/A 12/4/2018    COLONOSCOPY performed by Salima Estrada MD at [facility] ENDOSCOPY    COLONOSCOPY N/A 1/22/2021    COLONOSCOPY  :- performed by Salima Estrada MD at [facility] ENDOSCOPY    HX COLONOSCOPY  2014    HX HYSTERECTOMY       Family History   Problem Relation Age of Onset    Hypertension Mother     Cancer Father         lung cancer     Social History     Tobacco Use   Smoking Status Never   Smokeless Tobacco Never     Social History     Socioeconomic History    Marital status:    Tobacco Use    Smoking status: Never    Smokeless tobacco: Never   Vaping Use    Vaping Use: Never used   Substance and Sexual Activity    Alcohol use: Yes     Comment: rare    Sexual activity: Yes     Partners: Male     Comment:  RN at [facility]. Allergies   Allergen Reactions    Ace Inhibitors Cough       LAB REVIEW  Lab Results   Component Value Date/Time    Sodium 138 09/07/2021 04:21 PM    Potassium 4.1 09/07/2021 04:21 PM    Chloride 104 09/07/2021 04:21 PM    CO2 29 09/07/2021 04:21 PM    Anion gap 5 09/07/2021 04:21 PM    Glucose 90 09/07/2021 04:21 PM    BUN 14 09/07/2021 04:21 PM    Creatinine 0.57 09/07/2021 04:21 PM    BUN/Creatinine ratio 25 (H) 09/07/2021 04:21 PM    GFR est AA >60 09/07/2021 04:21 PM    GFR est non-AA >60 09/07/2021 04:21 PM    Calcium 9.2 09/07/2021 04:21 PM    Bilirubin, total 0.6 09/07/2021 04:21 PM    Alk.  phosphatase 83 09/07/2021 04:21 PM    Protein, total 7.8 09/07/2021 04:21 PM    Albumin 4.3 09/07/2021 04:21 PM    Globulin 3.5 09/07/2021 04:21 PM    A-G Ratio 1.2 09/07/2021 04:21 PM    ALT (SGPT) 32 09/07/2021 04:21 PM     Lab Results   Component Value Date/Time    WBC 6.1 09/07/2021 04:21 PM    HGB 10.4 (L) 09/07/2021 04:21 PM    HCT 33.9 (L) 09/07/2021 04:21 PM    PLATELET 320 89/22/4099 04:21 PM    .5 (H) 09/07/2021 04:21 PM     No results found for: HBA1C, ASJ1VEEY, RNR4WSHO, KXW3IZND  Lab Results   Component Value Date/Time    Cholesterol, total 194 09/07/2021 04:21 PM    HDL Cholesterol 57 09/07/2021 04:21 PM    LDL, calculated 100.8 (H) 09/07/2021 04:21 PM    VLDL, calculated 36.2 09/07/2021 04:21 PM    Triglyceride 181 (H) 09/07/2021 04:21 PM    CHOL/HDL Ratio 3.4 09/07/2021 04:21 PM           Diana Tran MD  [facility]  01/12/23 8:52 AM    Portions of this note may have been populated using smart dictation software and may have \"sounds-like\" errors present. Pt was counseled on risks, benefits and alternatives of treatment options. All questions were asked and answered and the patient was agreeable with the treatment plan as outlined. Total time on the date of encounter exceeded 48 minutes and included patient care, coordination of care, charting and preparation for visit.

## 2023-01-12 NOTE — PROGRESS NOTES
Chief Complaint   Patient presents with    Complete Physical     Visit Vitals  BP (!) 169/74   Pulse 76   Temp 97.2 °F (36.2 °C)   Resp 16   Ht 5' 4\" (1.626 m)   Wt 141 lb 4.8 oz (64.1 kg)   LMP  (LMP Unknown)   SpO2 96%   BMI 24.25 kg/m²     1. Have you been to the ER, urgent care clinic since your last visit? Hospitalized since your last visit? No    2. Have you seen or consulted any other health care providers outside of the [address] [place] [address] since your last visit? Include any pap smears or colon screening.  No

## 2023-01-12 NOTE — ACP (ADVANCE CARE PLANNING)
Advance Care Planning     Advance Care Planning (ACP) Physician/NP/PA Conversation      Date of Conversation: 1/12/2023  Conducted with: Patient with Decision Making Capacity    Healthcare Decision Maker:     Primary Decision Maker: Susu Gillespie - Spouse - [phone]  Click here to complete [address] including selection of the Healthcare Decision Maker Relationship (ie \"Primary\")      Today we documented Decision Maker(s) consistent with ACP documents on file. Care Preferences:    Hospitalization: \"If your health worsens and it becomes clear that your chance of recovery is unlikely, what would be your preference regarding hospitalization? \"  The patient would prefer hospitalization. Ventilation: \"If you were unable to breathe on your own and your chance of recovery was unlikely, what would be your preference about the use of a ventilator (breathing machine) if it was available to you? \"   The patient would desire the use of a ventilator. Resuscitation: \"In the event your heart stopped as a result of an underlying serious health condition, would you want attempts to be made to restart your heart, or would you prefer a natural death? \"   Yes, attempt to resuscitate.     Additional topics discussed: treatment goals, benefit/burden of treatment options, artificial nutrition, ventilation preferences, hospitalization preferences, and resuscitation preferences    Conversation Outcomes / Follow-Up Plan:   ACP in process - completing/providing documents   Reviewed DNR/DNI and patient elects Full Code (Attempt Resuscitation)     Length of Voluntary ACP Conversation in minutes:  16 minutes    Gretchen Mehta MD

## 2023-03-09 ENCOUNTER — ANESTHESIA EVENT (OUTPATIENT)
Dept: ENDOSCOPY | Age: 68
End: 2023-03-09
Payer: COMMERCIAL

## 2023-03-09 ENCOUNTER — HOSPITAL ENCOUNTER (OUTPATIENT)
Age: 68
Setting detail: OUTPATIENT SURGERY
Discharge: HOME | End: 2023-03-09
Attending: INTERNAL MEDICINE | Admitting: INTERNAL MEDICINE
Payer: COMMERCIAL

## 2023-03-09 ENCOUNTER — ANESTHESIA (OUTPATIENT)
Dept: ENDOSCOPY | Age: 68
End: 2023-03-09
Payer: COMMERCIAL

## 2023-03-09 VITALS
RESPIRATION RATE: 19 BRPM | HEART RATE: 80 BPM | OXYGEN SATURATION: 99 % | TEMPERATURE: 98.4 F | SYSTOLIC BLOOD PRESSURE: 150 MMHG | HEIGHT: 64 IN | WEIGHT: 138 LBS | BODY MASS INDEX: 23.56 KG/M2 | DIASTOLIC BLOOD PRESSURE: 79 MMHG

## 2023-03-09 PROCEDURE — 74011250637 HC RX REV CODE- 250/637: Performed by: INTERNAL MEDICINE

## 2023-03-09 PROCEDURE — 77030021593 HC FCPS BIOP ENDOSC BSC -A: Performed by: INTERNAL MEDICINE

## 2023-03-09 PROCEDURE — 76040000019: Performed by: INTERNAL MEDICINE

## 2023-03-09 PROCEDURE — 74011000250 HC RX REV CODE- 250: Performed by: NURSE ANESTHETIST, CERTIFIED REGISTERED

## 2023-03-09 PROCEDURE — 76060000031 HC ANESTHESIA FIRST 0.5 HR: Performed by: INTERNAL MEDICINE

## 2023-03-09 PROCEDURE — 74011250636 HC RX REV CODE- 250/636: Performed by: NURSE ANESTHETIST, CERTIFIED REGISTERED

## 2023-03-09 PROCEDURE — 88305 TISSUE EXAM BY PATHOLOGIST: CPT

## 2023-03-09 RX ORDER — LIDOCAINE HYDROCHLORIDE 20 MG/ML
INJECTION, SOLUTION EPIDURAL; INFILTRATION; INTRACAUDAL; PERINEURAL AS NEEDED
Status: DISCONTINUED | OUTPATIENT
Start: 2023-03-09 | End: 2023-03-09 | Stop reason: HOSPADM

## 2023-03-09 RX ORDER — MIDAZOLAM HYDROCHLORIDE 1 MG/ML
.25-5 INJECTION, SOLUTION INTRAMUSCULAR; INTRAVENOUS
Status: DISCONTINUED | OUTPATIENT
Start: 2023-03-09 | End: 2023-03-09 | Stop reason: HOSPADM

## 2023-03-09 RX ORDER — SODIUM CHLORIDE 9 MG/ML
150 INJECTION, SOLUTION INTRAVENOUS CONTINUOUS
Status: DISCONTINUED | OUTPATIENT
Start: 2023-03-09 | End: 2023-03-09 | Stop reason: HOSPADM

## 2023-03-09 RX ORDER — SODIUM CHLORIDE 9 MG/ML
INJECTION, SOLUTION INTRAVENOUS
Status: DISCONTINUED | OUTPATIENT
Start: 2023-03-09 | End: 2023-03-09 | Stop reason: HOSPADM

## 2023-03-09 RX ORDER — DEXTROMETHORPHAN/PSEUDOEPHED 2.5-7.5/.8
1.2 DROPS ORAL
Status: DISCONTINUED | OUTPATIENT
Start: 2023-03-09 | End: 2023-03-09 | Stop reason: HOSPADM

## 2023-03-09 RX ORDER — PROPOFOL 10 MG/ML
INJECTION, EMULSION INTRAVENOUS AS NEEDED
Status: DISCONTINUED | OUTPATIENT
Start: 2023-03-09 | End: 2023-03-09 | Stop reason: HOSPADM

## 2023-03-09 RX ORDER — SODIUM CHLORIDE 0.9 % (FLUSH) 0.9 %
5-40 SYRINGE (ML) INJECTION EVERY 8 HOURS
Status: DISCONTINUED | OUTPATIENT
Start: 2023-03-09 | End: 2023-03-09 | Stop reason: HOSPADM

## 2023-03-09 RX ORDER — EPINEPHRINE 0.1 MG/ML
1 INJECTION INTRACARDIAC; INTRAVENOUS
Status: DISCONTINUED | OUTPATIENT
Start: 2023-03-09 | End: 2023-03-09 | Stop reason: HOSPADM

## 2023-03-09 RX ORDER — FENTANYL CITRATE 50 UG/ML
200 INJECTION, SOLUTION INTRAMUSCULAR; INTRAVENOUS
Status: DISCONTINUED | OUTPATIENT
Start: 2023-03-09 | End: 2023-03-09 | Stop reason: HOSPADM

## 2023-03-09 RX ORDER — ATROPINE SULFATE 0.1 MG/ML
0.5 INJECTION INTRAVENOUS
Status: DISCONTINUED | OUTPATIENT
Start: 2023-03-09 | End: 2023-03-09 | Stop reason: HOSPADM

## 2023-03-09 RX ORDER — SODIUM CHLORIDE 0.9 % (FLUSH) 0.9 %
5-40 SYRINGE (ML) INJECTION AS NEEDED
Status: DISCONTINUED | OUTPATIENT
Start: 2023-03-09 | End: 2023-03-09 | Stop reason: HOSPADM

## 2023-03-09 RX ADMIN — LIDOCAINE HYDROCHLORIDE 50 MG: 20 INJECTION, SOLUTION EPIDURAL; INFILTRATION; INTRACAUDAL; PERINEURAL at 14:25

## 2023-03-09 RX ADMIN — PROPOFOL 20 MG: 10 INJECTION, EMULSION INTRAVENOUS at 14:27

## 2023-03-09 RX ADMIN — PROPOFOL 30 MG: 10 INJECTION, EMULSION INTRAVENOUS at 14:26

## 2023-03-09 RX ADMIN — PROPOFOL 30 MG: 10 INJECTION, EMULSION INTRAVENOUS at 14:33

## 2023-03-09 RX ADMIN — SODIUM CHLORIDE: 900 INJECTION, SOLUTION INTRAVENOUS at 14:18

## 2023-03-09 RX ADMIN — PROPOFOL 20 MG: 10 INJECTION, EMULSION INTRAVENOUS at 14:35

## 2023-03-09 RX ADMIN — PROPOFOL 30 MG: 10 INJECTION, EMULSION INTRAVENOUS at 14:30

## 2023-03-09 RX ADMIN — PROPOFOL 30 MG: 10 INJECTION, EMULSION INTRAVENOUS at 14:38

## 2023-03-09 RX ADMIN — PROPOFOL 20 MG: 10 INJECTION, EMULSION INTRAVENOUS at 14:32

## 2023-03-09 RX ADMIN — PROPOFOL 20 MG: 10 INJECTION, EMULSION INTRAVENOUS at 14:31

## 2023-03-09 RX ADMIN — PROPOFOL 20 MG: 10 INJECTION, EMULSION INTRAVENOUS at 14:28

## 2023-03-09 RX ADMIN — PROPOFOL 30 MG: 10 INJECTION, EMULSION INTRAVENOUS at 14:29

## 2023-03-09 RX ADMIN — PROPOFOL 50 MG: 10 INJECTION, EMULSION INTRAVENOUS at 14:25

## 2023-03-09 RX ADMIN — PROPOFOL 30 MG: 10 INJECTION, EMULSION INTRAVENOUS at 14:41

## 2023-03-09 NOTE — H&P
[address], [address] [place] [address]          Pre-procedure History and Physical       NAME:  Shani Ramirez   :   1955   MRN:   [id number]     CHIEF COMPLAINT/HPI: uc    PMH:  Past Medical History:   Diagnosis Date    Hypertension     Pyodermic gangrenosum     Ulcerative colitis        PSH:  Past Surgical History:   Procedure Laterality Date    COLONOSCOPY N/A 2016    COLONOSCOPY performed by Emperatriz Snydre MD at [facility] ENDOSCOPY    COLONOSCOPY N/A 2018    COLONOSCOPY performed by Bear Moses MD at [facility] ENDOSCOPY    COLONOSCOPY N/A 2021    COLONOSCOPY  :- performed by Bear Moses MD at [facility] ENDOSCOPY    HX COLONOSCOPY      HX HYSTERECTOMY         Allergies: Allergies   Allergen Reactions    Ace Inhibitors Cough       Home Medications:  Prior to Admission Medications   Prescriptions Last Dose Informant Patient Reported? Taking?   ascorbic acid (VITAMIN C PO) 3/8/2023  Yes Yes   Sig: Take  by mouth. ascorbic acid,sod/zinc ox,gluc (ZINC AND C PO) 3/8/2023  Yes Yes   Sig: Take  by mouth. aspirin delayed-release 81 mg tablet 3/7/2023  Yes Yes   Sig: Take  by mouth daily. folic acid (FOLVITE) 1 mg tablet 3/8/2023  Yes Yes   Sig: Take 1 mg by mouth daily. losartan-hydroCHLOROthiazide (HYZAAR) 100-25 mg per tablet 3/9/2023  No Yes   Sig: Take 1 Tablet by mouth daily. sulfaSALAzine (AZULFIDINE) 500 mg tablet 3/7/2023  Yes Yes   Sig: Take 500 mg by mouth two (2) times a day.       Facility-Administered Medications: None       Hospital Medications:  Current Facility-Administered Medications   Medication Dose Route Frequency    0.9% sodium chloride infusion  150 mL/hr IntraVENous CONTINUOUS    sodium chloride (NS) flush 5-40 mL  5-40 mL IntraVENous Q8H    sodium chloride (NS) flush 5-40 mL  5-40 mL IntraVENous PRN    midazolam (VERSED) injection 0.25-5 mg  0.25-5 mg IntraVENous Multiple    fentaNYL citrate (PF) injection 200 mcg  200 mcg IntraVENous Multiple    simethicone (MYLICON) 37JA/0.0BE oral drops 80 mg  1.2 mL Oral Multiple    atropine injection 0.5 mg  0.5 mg IntraVENous ONCE PRN    EPINEPHrine (ADRENALIN) 0.1 mg/mL syringe 1 mg  1 mg Endoscopically ONCE PRN       Family History:  Family History   Problem Relation Age of Onset    Hypertension Mother     Cancer Father         lung cancer       Social History:  Social History     Tobacco Use    Smoking status: Never    Smokeless tobacco: Never   Substance Use Topics    Alcohol use: Yes     Comment: rare       The patient was counseled at length about the risks of cleo Covid-19 in the toma-operative and post-operative states including the recovery window of their procedure. The patient was made aware that cleo Covid-19 after a surgical procedure may worsen their prognosis for recovering from the virus and lend to a higher morbidity and or mortality risk. The patient was given the options of postponing their procedure. All of the risks, benefits, and alternatives were discussed. The patient does  wish to proceed with the procedure. PHYSICAL EXAM PRIOR TO SEDATION:  General: Alert, in no acute distress    Lungs:            CTA bilaterally  Heart:  Normal S1, S2    Abdomen: Soft, Non distended, Non tender. Normoactive bowel sounds. Assessment:   Stable for sedation administration.   Date of last colonoscopy: 2 yrs, Polyps  No    Plan:     Endoscopic procedure with sedation     Signed By: Julisa Apodaca MD     3/9/2023  2:24 PM

## 2023-03-09 NOTE — PROCEDURES
Kavya Cormier [address] Adriano Sibley M.D.  [place], [address]  [phone]               Colonoscopy Procedure Note    NAME: Thomas Brown  :  1955  MRN:  [id number]    Indications:   Ulcerative colitis     : Kaiser Yap MD    Referring Provider:  Cade Alba MD    Staff: Endoscopy Ladonna Jaramillo: Waleska Quinn  Endoscopy RN-1: Carlyn Park RN    Prosthetic devices, grafts, tissues, transplant, or devices implanted: none    Medicines:  MAC anesthesia      Procedure Details:  After informed consent was obtained with all risks and benefits of the procedure explained and preprocedure exam completed, the patient was placed in the left lateral decubitus position. Universal protocol for patient identification was performed and documented in the nursing notes. Throughout the procedure, the patient's blood pressure was monitored at least every five minutes; pulse, and oxygen saturations were monitored continuously. All vital signs were documented in the nursing notes. A digital rectal exam was performed and was normal.  The Olympus videocolonoscope  was inserted in the rectum and carefully advanced to the cecum, which was identified by the ileocecal valve and appendiceal orifice. The colonoscope was slowly withdrawn with careful evaluation between folds. Retroflexion in the rectum was performed; findings and interventions are described below. Procedure start time, extent reached time/cecum time, and procedure end time are documented in the nursing notes. The quality of preparation was adequate.        Findings:   Abnormal vascularity in the rectum and sigmoid s/p biopsies  Rest of the colon was normal s/p biopsies    Interventions:    biopsy of colon colon    Specimens:   ID Type Source Tests Collected by Time Destination   1 : Right Colon Bx Preservative Colon, Right  Anali Fong MD 3/9/2023 7889 Pathology   2 : Transverse Colon Bx Preservative Colon, Transverse  Darrick Marrufo MD 3/9/2023 1437 Pathology   3 : Descending Colon Bx Preservative Colon, Descending  Darrick Marrufo MD 3/9/2023 1438 Pathology   4 : Rectum and Sigmoid Colon Bx Preservative Colon, Recto-sigmoid  Darrick Marrufo MD 3/9/2023 1441 Pathology       EBL:  None. Complications:   No immediate complications     Impression:  -See post-procedure diagnoses. Recommendations:   -Await pathology. Recommendation for next colonoscopy in 2 years  If < 10 years, reason:  above average risk patient    Resume normal medication(s). Follow-up office visit in 1 year. Signed by:  Amanda Rhodes MD          3/9/2023  2:47 PM

## 2023-03-09 NOTE — ANESTHESIA POSTPROCEDURE EVALUATION
Post-Anesthesia Evaluation and Assessment    Patient: Pavel Nye MRN: [id number]  SSN: [id number]    YOB: 1955  Age: 78 y.o. Sex: female      I have evaluated the patient and they are stable and ready for discharge from the PACU. Cardiovascular Function/Vital Signs  Visit Vitals  BP (!) 150/79   Pulse 80   Temp 36.9 °C (98.4 °F)   Resp 19   Ht 5' 4\" (1.626 m)   Wt 62.6 kg (138 lb)   SpO2 99%   Breastfeeding No   BMI 23.69 kg/m²       Patient is status post MAC anesthesia for Procedure(s):  COLONOSCOPY  COLON BIOPSY. Nausea/Vomiting: None    Postoperative hydration reviewed and adequate. Pain:  Pain Scale 1: Numeric (0 - 10) (03/09/23 1353)  Pain Intensity 1: 0 (03/09/23 1353)   Managed    Neurological Status: At baseline    Mental Status, Level of Consciousness: Alert and  oriented to person, place, and time    Pulmonary Status:   O2 Device: Nasal cannula (03/09/23 1447)   Adequate oxygenation and airway patent    Complications related to anesthesia: None    Post-anesthesia assessment completed. No concerns    Signed By: Selene Dyson MD     March 9, 2023              Procedure(s):  COLONOSCOPY  COLON BIOPSY. MAC    <BSHSIANPOST>    INITIAL Post-op Vital signs:   Vitals Value Taken Time   /79 03/09/23 1501   Temp     Pulse 77 03/09/23 1506   Resp 20 03/09/23 1506   SpO2 99 % 03/09/23 1506   Vitals shown include unvalidated device data.

## 2023-03-09 NOTE — ANESTHESIA PREPROCEDURE EVALUATION
Relevant Problems   No relevant active problems       Anesthetic History   No history of anesthetic complications            Review of Systems / Medical History  Patient summary reviewed, nursing notes reviewed and pertinent labs reviewed    Pulmonary  Within defined limits                 Neuro/Psych   Within defined limits           Cardiovascular  Within defined limits  Hypertension                   GI/Hepatic/Renal  Within defined limits         PUD     Endo/Other  Within defined limits           Other Findings              Physical Exam    Airway  Mallampati: II  TM Distance: > 6 cm  Neck ROM: normal range of motion   Mouth opening: Normal     Cardiovascular  Regular rate and rhythm,  S1 and S2 normal,  no murmur, click, rub, or gallop             Dental  No notable dental hx       Pulmonary  Breath sounds clear to auscultation               Abdominal  GI exam deferred       Other Findings            Anesthetic Plan    ASA: 2  Anesthesia type: MAC          Induction: Intravenous  Anesthetic plan and risks discussed with: Patient

## 2023-03-09 NOTE — PERIOP NOTES

## 2023-03-09 NOTE — PERIOP NOTES
Report from MB    1515: Pt verbalizes DC instructions. 1520: Pt dresses self in bathroom. 1525: Pt taken via WC to family car.

## 2023-03-09 NOTE — DISCHARGE INSTRUCTIONS
Kavya Crook [facility] 912 Wolf Perez M.D.  [place], [address] [facility]  [phone]          COLONOSCOPY [address] [facility]   [id number]  1955    DISCOMFORT:  Redness at IV site- apply warm compress to area; if redness or soreness persist- contact your physician  There may be a slight amount of blood passed from the rectum  Gaseous discomfort- walking, belching will help relieve any discomfort  You may not operate a vehicle for 12 hours  You may not engage in an occupation involving machinery or appliances for the  rest of today  You may not drink alcoholic beverages for at least 12 hours  Avoid making any critical decisions for at least 24 hours    DIET:   You may resume your normal diet, but some patients find that heavy or large  meals may lead to indigestion or vomiting. I suggest a light meal as first food  intake. I recommend a whole food, plant-based diet for your overall health. ACTIVITY:  You may resume your normal daily activities. It is recommended that you spend the remainder of the day resting - avoid any strenuous activity. CALL M.DVicky IF ANY SIGN OF:   Increasing pain, nausea, vomiting  Abdominal distension (swelling)  Significant bleeding (oral or rectal)  Fever   Pain in chest area  Shortness of breath    Additional Instructions:   Call Dr. Wolf Perez if any questions or problems at [phone]   Setup follow-up office visit in 1 year   You should receive the biopsy results by phone or mail within 3 weeks, if not, call  my office for the results      Should have a repeat colonoscopy in 2 years. Colonoscopy showed no signs of active inflammation. Biopsies taken. Learning About Coronavirus [phone])  Coronavirus [phone]): Overview  What is coronavirus (COVID-19)? The coronavirus disease (COVID-19) is caused by a virus. It is an illness that was first found in [place], [place], in December 2019. It has since spread worldwide.   The virus can cause fever, cough, and trouble breathing. In severe cases, it can cause pneumonia and make it hard to breathe without help. It can cause death. Coronaviruses are a large group of viruses. They cause the common cold. They also cause more serious illnesses like Middle East respiratory syndrome (MERS) and severe acute respiratory syndrome (SARS). COVID-19 is caused by a novel coronavirus. That means it's a new type that has not been seen in people before. This virus spreads person-to-person through droplets from coughing and sneezing. It can also spread when you are close to someone who is infected. And it can spread when you touch something that has the virus on it, such as a doorknob or a tabletop. What can you do to protect yourself from coronavirus (COVID-19)? The best way to protect yourself from getting sick is to: Avoid areas where there is an outbreak. Avoid contact with people who may be infected. Wash your hands often with soap or alcohol-based hand sanitizers. Avoid crowds and try to stay at least 6 feet away from other people. Wash your hands often, especially after you cough or sneeze. Use soap and water, and scrub for at least 20 seconds. If soap and water aren't available, use an alcohol-based hand . Avoid touching your mouth, nose, and eyes. What can you do to avoid spreading the virus to others? To help avoid spreading the virus to others:  Cover your mouth with a tissue when you cough or sneeze. Then throw the tissue in the trash. Use a disinfectant to clean things that you touch often. Stay home if you are sick or have been exposed to the virus. Don't go to school, work, or public areas. And don't use public transportation. If you are sick:  Leave your home only if you need to get medical care. But call the doctor's office first so they know you're coming. And wear a face mask, if you have one. If you have a face mask, wear it whenever you're around other people.  It can help stop the spread of the virus when you cough or sneeze. Clean and disinfect your home every day. Use household  and disinfectant wipes or sprays. Take special care to clean things that you grab with your hands. These include doorknobs, remote controls, phones, and handles on your refrigerator and microwave. And don't forget countertops, tabletops, bathrooms, and computer keyboards. When to call for help  Call 911 anytime you think you may need emergency care. For example, call if:  You have severe trouble breathing. (You can't talk at all.)  You have constant chest pain or pressure. You are severely dizzy or lightheaded. You are confused or can't think clearly. Your face and lips have a blue color. You pass out (lose consciousness) or are very hard to wake up. Call your doctor now if you develop symptoms such as:  Shortness of breath. Fever. Cough. If you need to get care, call ahead to the doctor's office for instructions before you go. Make sure you wear a face mask, if you have one, to prevent exposing other people to the virus. Where can you get the latest information? The following health organizations are tracking and studying this virus. Their websites contain the most up-to-date information. Dilia Robertson also learn what to do if you think you may have been exposed to the virus. U.S. Centers for Disease Control and Prevention (CDC): The CDC provides updated news about the disease and travel advice. The website also tells you how to prevent the spread of infection. www.cdc.gov  World Health Organization [facility]): WHO offers information about the virus outbreaks. WHO also has travel advice. www.who.int  Current as of: April 1, 2020               Content Version: 12.4  © 0956-4832 [employer].    Care instructions adapted under license by your healthcare professional. If you have questions about a medical condition or this instruction, always ask your healthcare professional. [employer] disclaims any warranty or liability for your use of this information.

## 2023-04-17 ENCOUNTER — TRANSCRIBE ORDER (OUTPATIENT)
Dept: SCHEDULING | Age: 68
End: 2023-04-17

## 2023-04-17 DIAGNOSIS — M50.30 DEGENERATION OF CERVICAL INTERVERTEBRAL DISC: ICD-10-CM

## 2023-04-17 DIAGNOSIS — M54.12 BRACHIAL NEURITIS: Primary | ICD-10-CM

## 2023-04-19 ENCOUNTER — VIRTUAL VISIT (OUTPATIENT)
Dept: FAMILY MEDICINE CLINIC | Age: 68
End: 2023-04-19
Payer: MEDICARE

## 2023-04-19 DIAGNOSIS — M65.4 DE QUERVAIN'S TENOSYNOVITIS, RIGHT: ICD-10-CM

## 2023-04-19 DIAGNOSIS — M65.30 TRIGGER FINGER, UNSPECIFIED FINGER, UNSPECIFIED LATERALITY: ICD-10-CM

## 2023-04-19 DIAGNOSIS — I10 PRIMARY HYPERTENSION: Primary | ICD-10-CM

## 2023-04-19 DIAGNOSIS — M54.12 BRACHIAL (CERVICAL) NEURITIS: ICD-10-CM

## 2023-04-19 PROCEDURE — G8399 PT W/DXA RESULTS DOCUMENT: HCPCS | Performed by: FAMILY MEDICINE

## 2023-04-19 PROCEDURE — G8427 DOCREV CUR MEDS BY ELIG CLIN: HCPCS | Performed by: FAMILY MEDICINE

## 2023-04-19 PROCEDURE — 1090F PRES/ABSN URINE INCON ASSESS: CPT | Performed by: FAMILY MEDICINE

## 2023-04-19 PROCEDURE — 99213 OFFICE O/P EST LOW 20 MIN: CPT | Performed by: FAMILY MEDICINE

## 2023-04-19 PROCEDURE — 1101F PT FALLS ASSESS-DOCD LE1/YR: CPT | Performed by: FAMILY MEDICINE

## 2023-04-19 PROCEDURE — 1123F ACP DISCUSS/DSCN MKR DOCD: CPT | Performed by: FAMILY MEDICINE

## 2023-04-19 PROCEDURE — 3017F COLORECTAL CA SCREEN DOC REV: CPT | Performed by: FAMILY MEDICINE

## 2023-04-19 PROCEDURE — G9899 SCRN MAM PERF RSLTS DOC: HCPCS | Performed by: FAMILY MEDICINE

## 2023-04-19 PROCEDURE — G8510 SCR DEP NEG, NO PLAN REQD: HCPCS | Performed by: FAMILY MEDICINE

## 2023-04-19 NOTE — PROGRESS NOTES
Chief Complaint   Patient presents with    Hypertension     Follow up. 1. Have you been to the ER, urgent care clinic since your last visit? Hospitalized since your last visit? No    2. Have you seen or consulted any other health care providers outside of the [address] [place] [address] since your last visit? Include any pap smears or colon screening.  No

## 2023-04-19 NOTE — PROGRESS NOTES
Shani Ramirez is a 78 y.o. female who was seen by synchronous (real-time) audio-video technology on 4/19/2023. Consent: Shani Ramirez, who was seen by synchronous (real-time) audio-video technology, and/or her healthcare decision maker, is aware that this patient-initiated, Telehealth encounter on 4/19/2023 is a billable service, with coverage as determined by her insurance carrier. She is aware that she may receive a bill and has provided verbal consent to proceed: Yes. Assessment & Plan:       ICD-10-CM ICD-9-CM    1. Primary hypertension  I10 401.9       2. Trigger finger, unspecified finger, unspecified laterality  M65.30 727.03       3. De Quervain's tenosynovitis, right  M65.4 727.04       4. Brachial (cervical) neuritis  M54.12 723.4         Bp is great  No change today  Continue to see ortho for pain and work up related to cervical radicular sympomts    Mwv in january    Pt was counseled on risks, benefits and alternatives of treatment options. All questions were asked and answered and the patient was agreeable with the treatment plan as outlined. Subjective:   Shani Ramirez is a 78 y.o. female who was seen for Hypertension (Follow up.)      HTN: patient reports stable on medications. No chest pain, sob, headache, blurred vision, leg swelling, diaphoresis, falls. Compliant with medications as prescribed. is checking blood pressures at home and reports range is 120s/60-70. No significant hyper or hypotensive episodes that the patient reports today.   Had increased med at last visit and is better controlled than previously    She says \"I feel great for everything is fine\"    Trigger finger: injected on the L and it is better  On the R side she has a pending MRI for shoulder  Physical therapy has caused a drastic improvement in symptoms  Not waking up as much in pain  But persistent positional numbness  Does not need medicine for pain, no longer a throbbing feeling, its mmore of an intermittent discomfort  Medications, allergies, PMH, PSH, SOCH, [address] reviewed and updated per routine protocol, see chart for review and changes if not noted here. ROS  A 12 point review of systems was negative except as noted here or in the HPI.     Objective:   Vital Signs: (As obtained by patient/caregiver at home)  Patient-Reported Vitals 4/18/2023   Patient-Reported Weight 137   Patient-Reported Pulse 81   Patient-Reported Temperature 97.4   Patient-Reported SpO2 N/A   Patient-Reported Systolic  735   Patient-Reported Diastolic 64   Patient-Reported Peak Flow N/A   Patient-Reported LMP N/A        [INSTRUCTIONS:  \"[x]\" Indicates a positive item  \"[]\" Indicates a negative item  -- DELETE ALL ITEMS NOT EXAMINED]    Constitutional: [x] Appears well-developed and well-nourished [x] No apparent distress      [] Abnormal -     Mental status: [x] Alert and awake  [x] Oriented to person/place/time [x] Able to follow commands    [] Abnormal -     Eyes:   EOM    [x]  Normal    [] Abnormal -   Sclera  [x]  Normal    [] Abnormal -          Discharge [x]  None visible   [] Abnormal -     HENT: [x] Normocephalic, atraumatic  [] Abnormal -   [x] Mouth/Throat: Mucous membranes are moist    External Ears [x] Normal  [] Abnormal -    Neck: [x] No visualized mass [] Abnormal -     Pulmonary/Chest: [x] Respiratory effort normal   [x] No visualized signs of difficulty breathing or respiratory distress        [] Abnormal -      Musculoskeletal:   [] Normal gait with no signs of ataxia         [x] Normal range of motion of neck        [] Abnormal -     Neurological:        [x] No Facial Asymmetry (Cranial nerve 7 motor function) (limited exam due to video visit)          [x] No gaze palsy        [] Abnormal -          Skin:        [x] No significant exanthematous lesions or discoloration noted on facial skin         [] Abnormal -            Psychiatric:       [x] Normal Affect [] Abnormal -        [x] No Hallucinations    Other pertinent observable physical exam findings:seated    We discussed the expected course, resolution and complications of the diagnosis(es) in detail. Medication risks, benefits, costs, interactions, and alternatives were discussed as indicated. I advised her to contact the office if her condition worsens, changes or fails to improve as anticipated. She expressed understanding with the diagnosis(es) and plan. Herminio Tellez is a 78 y.o. female who was evaluated by a video visit encounter for concerns as above. Patient identification was verified prior to start of the visit. A caregiver was present when appropriate. Due to this being a TeleHealth encounter ([facility]), evaluation of the following organ systems was limited: Vitals/Constitutional/EENT/Resp/CV/GI//MS/Neuro/Skin/Heme-Lymph-Imm. Pursuant to the emergency declaration under the [facility], 1135 waiver authority and the [employer] Act, this Virtual  Visit was conducted, with patient's (and/or legal guardian's) consent, to reduce the patient's risk of exposure to COVID-19 and provide necessary medical care. Services were provided through a video synchronous discussion virtually to substitute for in-person clinic visit. Patient and provider were located at their individual homes. Jermaine Krause MD  [facility]  04/19/23 11:36 AM     Portions of this note may have been populated using smart dictation software and may have \"sounds-like\" errors present.

## 2023-04-22 DIAGNOSIS — Z78.0 POSTMENOPAUSAL: Primary | ICD-10-CM

## 2023-04-22 DIAGNOSIS — M85.80 OSTEOPENIA, UNSPECIFIED LOCATION: ICD-10-CM

## 2023-04-23 DIAGNOSIS — M54.12 BRACHIAL NEURITIS: Primary | ICD-10-CM

## 2023-04-23 DIAGNOSIS — M50.30 DEGENERATION OF CERVICAL INTERVERTEBRAL DISC: ICD-10-CM

## 2023-04-26 ENCOUNTER — HOSPITAL ENCOUNTER (OUTPATIENT)
Dept: MRI IMAGING | Age: 68
Discharge: HOME | End: 2023-04-26
Attending: STUDENT IN AN ORGANIZED HEALTH CARE EDUCATION/TRAINING PROGRAM
Payer: MEDICARE

## 2023-04-26 DIAGNOSIS — M50.30 DEGENERATION OF CERVICAL INTERVERTEBRAL DISC: ICD-10-CM

## 2023-04-26 DIAGNOSIS — M54.12 BRACHIAL NEURITIS: ICD-10-CM

## 2023-04-26 PROCEDURE — 72141 MRI NECK SPINE W/O DYE: CPT

## 2023-07-06 RX ORDER — LOSARTAN POTASSIUM AND HYDROCHLOROTHIAZIDE 25; 100 MG/1; MG/1
TABLET ORAL
Qty: 90 TABLET | Refills: 1 | Status: SHIPPED | OUTPATIENT
Start: 2023-07-06

## 2023-08-30 ENCOUNTER — PATIENT MESSAGE (OUTPATIENT)
Facility: CLINIC | Age: 68
End: 2023-08-30

## 2023-08-30 DIAGNOSIS — Z12.31 BREAST CANCER SCREENING BY MAMMOGRAM: Primary | ICD-10-CM

## 2023-08-31 NOTE — TELEPHONE ENCOUNTER
From: Prema Araiza  To: Dr. Terrance Blair: 8/30/2023 8:10 PM EDT  Subject: Mammogram     I usually have a mammogram in October.  Would put the order in so I can schedule the mammogram?

## 2023-10-09 RX ORDER — LOSARTAN POTASSIUM AND HYDROCHLOROTHIAZIDE 25; 100 MG/1; MG/1
TABLET ORAL
Qty: 90 TABLET | Refills: 1 | Status: SHIPPED | OUTPATIENT
Start: 2023-10-09 | End: 2023-10-13 | Stop reason: SDUPTHER

## 2023-10-13 RX ORDER — LOSARTAN POTASSIUM AND HYDROCHLOROTHIAZIDE 25; 100 MG/1; MG/1
1 TABLET ORAL DAILY
Qty: 90 TABLET | Refills: 1 | Status: SHIPPED | OUTPATIENT
Start: 2023-10-13

## 2023-10-16 ENCOUNTER — HOSPITAL ENCOUNTER (OUTPATIENT)
Facility: HOSPITAL | Age: 68
Discharge: HOME | End: 2023-10-19
Attending: FAMILY MEDICINE
Payer: MEDICARE

## 2023-10-16 VITALS — HEIGHT: 64 IN | WEIGHT: 138 LBS | BODY MASS INDEX: 23.56 KG/M2

## 2023-10-16 DIAGNOSIS — Z12.31 BREAST CANCER SCREENING BY MAMMOGRAM: ICD-10-CM

## 2023-10-16 PROCEDURE — 77063 BREAST TOMOSYNTHESIS BI: CPT

## 2023-11-06 ENCOUNTER — HOSPITAL ENCOUNTER (OUTPATIENT)
Facility: HOSPITAL | Age: 68
Discharge: HOME | End: 2023-11-09
Payer: MEDICARE

## 2023-11-06 DIAGNOSIS — Z78.0 POSTMENOPAUSAL: ICD-10-CM

## 2023-11-06 DIAGNOSIS — M85.80 OSTEOPENIA, UNSPECIFIED LOCATION: ICD-10-CM

## 2023-11-06 PROCEDURE — 77080 DXA BONE DENSITY AXIAL: CPT

## 2024-01-16 RX ORDER — LOSARTAN POTASSIUM AND HYDROCHLOROTHIAZIDE 25; 100 MG/1; MG/1
1 TABLET ORAL DAILY
Qty: 90 TABLET | Refills: 1 | Status: SHIPPED | OUTPATIENT
Start: 2024-01-16

## 2024-01-16 SDOH — ECONOMIC STABILITY: FOOD INSECURITY: WITHIN THE PAST 12 MONTHS, YOU WORRIED THAT YOUR FOOD WOULD RUN OUT BEFORE YOU GOT MONEY TO BUY MORE.: NEVER TRUE

## 2024-01-16 SDOH — HEALTH STABILITY: PHYSICAL HEALTH: ON AVERAGE, HOW MANY DAYS PER WEEK DO YOU ENGAGE IN MODERATE TO STRENUOUS EXERCISE (LIKE A BRISK WALK)?: 7 DAYS

## 2024-01-16 SDOH — ECONOMIC STABILITY: INCOME INSECURITY: HOW HARD IS IT FOR YOU TO PAY FOR THE VERY BASICS LIKE FOOD, HOUSING, MEDICAL CARE, AND HEATING?: NOT HARD AT ALL

## 2024-01-16 SDOH — ECONOMIC STABILITY: TRANSPORTATION INSECURITY
IN THE PAST 12 MONTHS, HAS LACK OF TRANSPORTATION KEPT YOU FROM MEETINGS, WORK, OR FROM GETTING THINGS NEEDED FOR DAILY LIVING?: NO

## 2024-01-16 SDOH — ECONOMIC STABILITY: HOUSING INSECURITY
IN THE LAST 12 MONTHS, WAS THERE A TIME WHEN YOU DID NOT HAVE A STEADY PLACE TO SLEEP OR SLEPT IN A SHELTER (INCLUDING NOW)?: NO

## 2024-01-16 SDOH — HEALTH STABILITY: PHYSICAL HEALTH: ON AVERAGE, HOW MANY MINUTES DO YOU ENGAGE IN EXERCISE AT THIS LEVEL?: 30 MIN

## 2024-01-16 SDOH — ECONOMIC STABILITY: FOOD INSECURITY: WITHIN THE PAST 12 MONTHS, THE FOOD YOU BOUGHT JUST DIDN'T LAST AND YOU DIDN'T HAVE MONEY TO GET MORE.: NEVER TRUE

## 2024-01-16 ASSESSMENT — PATIENT HEALTH QUESTIONNAIRE - PHQ9
SUM OF ALL RESPONSES TO PHQ QUESTIONS 1-9: 0
SUM OF ALL RESPONSES TO PHQ9 QUESTIONS 1 & 2: 0
1. LITTLE INTEREST OR PLEASURE IN DOING THINGS: 0
2. FEELING DOWN, DEPRESSED OR HOPELESS: 0
SUM OF ALL RESPONSES TO PHQ QUESTIONS 1-9: 0

## 2024-01-16 ASSESSMENT — LIFESTYLE VARIABLES
HOW MANY STANDARD DRINKS CONTAINING ALCOHOL DO YOU HAVE ON A TYPICAL DAY: 1 OR 2
HOW OFTEN DO YOU HAVE SIX OR MORE DRINKS ON ONE OCCASION: 1
HOW OFTEN DO YOU HAVE A DRINK CONTAINING ALCOHOL: 2
HOW MANY STANDARD DRINKS CONTAINING ALCOHOL DO YOU HAVE ON A TYPICAL DAY: 1
HOW OFTEN DO YOU HAVE A DRINK CONTAINING ALCOHOL: MONTHLY OR LESS

## 2024-01-19 ENCOUNTER — OFFICE VISIT (OUTPATIENT)
Facility: CLINIC | Age: 69
End: 2024-01-19

## 2024-01-19 VITALS
SYSTOLIC BLOOD PRESSURE: 134 MMHG | WEIGHT: 139 LBS | BODY MASS INDEX: 23.73 KG/M2 | DIASTOLIC BLOOD PRESSURE: 66 MMHG | TEMPERATURE: 97 F | HEIGHT: 64 IN | OXYGEN SATURATION: 99 % | HEART RATE: 78 BPM

## 2024-01-19 DIAGNOSIS — Z00.00 MEDICARE ANNUAL WELLNESS VISIT, SUBSEQUENT: Primary | ICD-10-CM

## 2024-01-19 DIAGNOSIS — K51.30 ULCERATIVE (CHRONIC) RECTOSIGMOIDITIS WITHOUT COMPLICATIONS (HCC): ICD-10-CM

## 2024-01-19 DIAGNOSIS — L88 PYODERMA GANGRENOSA: ICD-10-CM

## 2024-01-19 DIAGNOSIS — I10 PRIMARY HYPERTENSION: ICD-10-CM

## 2024-01-19 DIAGNOSIS — M85.89 OSTEOPENIA OF MULTIPLE SITES: ICD-10-CM

## 2024-01-19 DIAGNOSIS — D75.89 MACROCYTOSIS: ICD-10-CM

## 2024-01-19 DIAGNOSIS — Z71.89 ACP (ADVANCE CARE PLANNING): ICD-10-CM

## 2024-01-19 DIAGNOSIS — E78.49 OTHER HYPERLIPIDEMIA: ICD-10-CM

## 2024-01-19 DIAGNOSIS — I73.9 PERIPHERAL VASCULAR DISEASE, UNSPECIFIED (HCC): ICD-10-CM

## 2024-01-19 LAB
ALBUMIN SERPL-MCNC: 4.2 G/DL (ref 3.5–5)
ALBUMIN/GLOB SERPL: 1.1 (ref 1.1–2.2)
ALP SERPL-CCNC: 95 U/L (ref 45–117)
ALT SERPL-CCNC: 168 U/L (ref 12–78)
ANION GAP SERPL CALC-SCNC: 11 MMOL/L (ref 5–15)
AST SERPL-CCNC: 105 U/L (ref 15–37)
BASOPHILS # BLD: 0.1 K/UL (ref 0–0.1)
BASOPHILS NFR BLD: 2 % (ref 0–1)
BILIRUB SERPL-MCNC: 0.5 MG/DL (ref 0.2–1)
BUN SERPL-MCNC: 10 MG/DL (ref 6–20)
BUN/CREAT SERPL: 19 (ref 12–20)
CALCIUM SERPL-MCNC: 9.7 MG/DL (ref 8.5–10.1)
CHLORIDE SERPL-SCNC: 95 MMOL/L (ref 97–108)
CHOLEST SERPL-MCNC: 220 MG/DL
CO2 SERPL-SCNC: 27 MMOL/L (ref 21–32)
CREAT SERPL-MCNC: 0.52 MG/DL (ref 0.55–1.02)
DIFFERENTIAL METHOD BLD: ABNORMAL
EOSINOPHIL # BLD: 0.1 K/UL (ref 0–0.4)
EOSINOPHIL NFR BLD: 1 % (ref 0–7)
ERYTHROCYTE [DISTWIDTH] IN BLOOD BY AUTOMATED COUNT: 13.2 % (ref 11.5–14.5)
FERRITIN SERPL-MCNC: 253 NG/ML (ref 8–252)
FOLATE SERPL-MCNC: 79.3 NG/ML (ref 5–21)
GLOBULIN SER CALC-MCNC: 3.9 G/DL (ref 2–4)
GLUCOSE SERPL-MCNC: 97 MG/DL (ref 65–100)
HCT VFR BLD AUTO: 33 % (ref 35–47)
HDLC SERPL-MCNC: 84 MG/DL
HDLC SERPL: 2.6 (ref 0–5)
HGB BLD-MCNC: 10.8 G/DL (ref 11.5–16)
IMM GRANULOCYTES # BLD AUTO: 0 K/UL (ref 0–0.04)
IMM GRANULOCYTES NFR BLD AUTO: 1 % (ref 0–0.5)
LDLC SERPL CALC-MCNC: 124.2 MG/DL (ref 0–100)
LYMPHOCYTES # BLD: 1.7 K/UL (ref 0.8–3.5)
LYMPHOCYTES NFR BLD: 40 % (ref 12–49)
MCH RBC QN AUTO: 31.2 PG (ref 26–34)
MCHC RBC AUTO-ENTMCNC: 32.7 G/DL (ref 30–36.5)
MCV RBC AUTO: 95.4 FL (ref 80–99)
MONOCYTES # BLD: 0.7 K/UL (ref 0–1)
MONOCYTES NFR BLD: 17 % (ref 5–13)
NEUTS SEG # BLD: 1.6 K/UL (ref 1.8–8)
NEUTS SEG NFR BLD: 39 % (ref 32–75)
NRBC # BLD: 0 K/UL (ref 0–0.01)
NRBC BLD-RTO: 0 PER 100 WBC
PLATELET # BLD AUTO: 275 K/UL (ref 150–400)
PMV BLD AUTO: 10.9 FL (ref 8.9–12.9)
POTASSIUM SERPL-SCNC: 3.7 MMOL/L (ref 3.5–5.1)
PROT SERPL-MCNC: 8.1 G/DL (ref 6.4–8.2)
RBC # BLD AUTO: 3.46 M/UL (ref 3.8–5.2)
SODIUM SERPL-SCNC: 133 MMOL/L (ref 136–145)
TRIGL SERPL-MCNC: 59 MG/DL
VIT B12 SERPL-MCNC: 785 PG/ML (ref 193–986)
VLDLC SERPL CALC-MCNC: 11.8 MG/DL
WBC # BLD AUTO: 4.2 K/UL (ref 3.6–11)

## 2024-01-19 NOTE — PROGRESS NOTES
Identified pt with two pt identifiers(name and ). Reviewed record in preparation for visit and have obtained necessary documentation. All patient medications has been reviewed.  No chief complaint on file.      Vitals:    24 0935   BP: (!) 154/64   Pulse: 78   Temp: 97 °F (36.1 °C)   SpO2: 99%                   Coordination of Care Questionnaire:   1) Have you been to an emergency room, urgent care, or hospitalized since your last visit?   no    2. Have seen or consulted any other health care provider since your last visit? no            
cataracts, macular degeneration, and other eye disorders.  A preventive dental visit is recommended every 6 months.  Try to get at least 150 minutes of exercise per week or 10,000 steps per day on a pedometer .  Order or download the FREE \"Exercise & Physical Activity: Your Everyday Guide\" from [employer]. Call 1-322.761.5065 or search [employer] online.  You need 2641-9965 mg of calcium and 7343-3704 IU of vitamin D per day. It is possible to meet your calcium requirement with diet alone, but a vitamin D supplement is usually necessary to meet this goal.  When exposed to the sun, use a sunscreen that protects against both UVA and UVB radiation with an SPF of 30 or greater. Reapply every 2 to 3 hours or after sweating, drying off with a towel, or swimming.  Always wear a seat belt when traveling in a car. Always wear a helmet when riding a bicycle or motorcycle.      CHIEF COMPLAINT  Chief Complaint   Patient presents with    Medicare AWV       SUBJECTIVE  Layne Mcghee is a 68 y.o. female presenting today for follow up    Dr Levine for eye doctor, seen last year  Seen by dermatology about a year ago--[facility]  Gastro is Dr Powell, was scoped last March, planning to be seen for fare this year  Continues to follow with Dr Grove for intervention  Wrist is back to hurting, so going back to Dr Iván King for that (de q tenosynovitis)    Has had a 12.4 % loss of bone density on the R hip,which may be related to PG 2/2 UC on the R achilles    HTN: patient reports stable on medications. No chest pain, sob, headache, blurred vision, leg swelling, diaphoresis, falls. is  compliant with medications as prescribed. is checking blood pressures at home and reports range is controlled<130/60-80s. No significant hyper or hypotensive episodes that the patient reports today.    ROS  Review of Systems  A 12 point review of systems was negative except as noted here or in the

## 2024-01-19 NOTE — ACP (ADVANCE CARE PLANNING)
Advance Care Planning     Advance Care Planning (ACP) Physician/NP/PA Conversation    Date of Conversation: 1/19/2024  Conducted with: Patient with Decision Making Capacity    Healthcare Decision Maker:      Primary Decision Maker: Ronald Mcghee - Spouse - [phone]    Secondary Decision Maker: Umer Mcghee - Child - [phone]    Click here to complete Healthcare Decision Makers including selection of the Healthcare Decision Maker Relationship (ie \"Primary\")  Today we documented Decision Maker(s) consistent with Legal Next of Kin hierarchy.    Care Preferences:    Hospitalization:  \"If your health worsens and it becomes clear that your chance of recovery is unlikely, what would be your preference regarding hospitalization?\"  The patient would prefer hospitalization.    Ventilation:  \"If you were unable to breath on your own and your chance of recovery was unlikely, what would be your preference about the use of a ventilator (breathing machine) if it was available to you?\"  The patient would desire the use of a ventilator.    Resuscitation:  \"In the event your heart stopped as a result of an underlying serious health condition, would you want attempts made to restart your heart, or would you prefer a natural death?\"  Yes, attempt to resuscitate.    treatment goals, artificial nutrition, ventilation preferences, and hospitalization preferences    Conversation Outcomes / Follow-Up Plan:  ACP in process - completing/providing documents  Reviewed DNR/DNI and patient elects Full Code (Attempt Resuscitation)    Length of Voluntary ACP Conversation in minutes:  <16 minutes (Non-Billable)    Nazia Velázquez MD

## 2024-01-19 NOTE — PATIENT INSTRUCTIONS
every 6 months.  Try to get at least 150 minutes of exercise per week or 10,000 steps per day on a pedometer .  Order or download the FREE \"Exercise & Physical Activity: Your Everyday Guide\" from [employer]. Call 1-266.769.6287 or search [employer] online.  You need 0844-7627 mg of calcium and 5646-6630 IU of vitamin D per day. It is possible to meet your calcium requirement with diet alone, but a vitamin D supplement is usually necessary to meet this goal.  When exposed to the sun, use a sunscreen that protects against both UVA and UVB radiation with an SPF of 30 or greater. Reapply every 2 to 3 hours or after sweating, drying off with a towel, or swimming.  Always wear a seat belt when traveling in a car. Always wear a helmet when riding a bicycle or motorcycle.

## 2024-01-31 DIAGNOSIS — R79.89 ELEVATED LFTS: Primary | ICD-10-CM

## 2024-01-31 NOTE — PROGRESS NOTES
Lab Results   Component Value Date     (L) 01/19/2024    K 3.7 01/19/2024    CL 95 (L) 01/19/2024    CO2 27 01/19/2024    BUN 10 01/19/2024    CREATININE 0.52 (L) 01/19/2024    GLUCOSE 97 01/19/2024    CALCIUM 9.7 01/19/2024    PROT 8.1 01/19/2024    LABALBU 4.2 01/19/2024    BILITOT 0.5 01/19/2024    ALKPHOS 95 01/19/2024     (H) 01/19/2024     (H) 01/19/2024    LABGLOM >60 01/19/2024    GFRAA >60 06/21/2022    AGRATIO 1.1 01/19/2024    GLOB 3.9 01/19/2024

## 2024-02-29 DIAGNOSIS — R79.89 ELEVATED LFTS: ICD-10-CM

## 2024-02-29 LAB
ALBUMIN SERPL-MCNC: 3.9 G/DL (ref 3.5–5)
ALBUMIN/GLOB SERPL: 1.1 (ref 1.1–2.2)
ALP SERPL-CCNC: 85 U/L (ref 45–117)
ALT SERPL-CCNC: 37 U/L (ref 12–78)
AST SERPL-CCNC: 32 U/L (ref 15–37)
BILIRUB SERPL-MCNC: 0.6 MG/DL (ref 0.2–1)
GLOBULIN SER CALC-MCNC: 3.5 G/DL (ref 2–4)
PROT SERPL-MCNC: 7.4 G/DL (ref 6.4–8.2)

## 2024-07-08 NOTE — DISCHARGE INSTRUCTIONS
Upper Extremity Surgery Discharge Instructions  Dr. Martin Rm / Flores Hackett PA-C        Please take the time to review the following instructions before you leave the surgical center and use them as guidelines during your recovery from surgery. If you have any questions, the most efficient way to contact us is via the messaging portal on [employer]. The [employer] team works on checking and answering these messages throughout the workday to get back with you as quickly as possible. After hours and on weekends (outside of 8am-5pm Monday-Friday) please call [phone] to speak with the on-call provider.      Wound Care / Dressing Change   Do NOT remove your dressing or get them wet.       Showering / Bathing   You may bathe/shower as long as dressing/splint/cast is kept dry.        Sling   If you had a nerve block done (the entire arm is numb), please wear a sling for comfort and support until the nerve block wears off. Once the nerve block has worn off and you are able to move the arm again, you are not required to wear a sling and should only do so as needed for comfort.        Activity   No lifting with affected hand.   Please begin using fingers immediately after surgery, working to improve finger motion.       Ice and Elevation   Please use ice consistently over the operative site for 48 hours after surgery. After 48 hours, you should ice 3 times per day for 20 minutes at a time for the next 5 days. One week after surgery, you may use ice as needed for pain.        Diet   You may advance your regular diet as tolerated. Increase your clear liquid intake for the next 2-3 days.        Driving  Please do NOT drive while taking prescription pain medication (opioids).   Please do NOT drive during the post-operative period during which you are in your post-op dressing/splint/cast. While it is not illegal to drive with an injured or post-surgical hand/arm, one often has delayed reaction  or shortness of breath, please alert your primary care physician immediately.     To contact Dr. Rm’s office the most efficient way to contact us is via the messaging portal on Button. The [employer] team works on checking and answering these messages throughout the workday to get back with you as quickly as possible.  During office hours we are working to take care of our patients in the clinic and cannot always get to the phone. However, you may choose to call [phone] during office hours (8am-5pm) to leave a voicemail, which will be addressed by the end of the workday.      After hours and on weekends (outside of 8am-5pm Monday-Friday) please call [phone] to speak with the on-call provider.

## 2024-07-10 RX ORDER — LOSARTAN POTASSIUM AND HYDROCHLOROTHIAZIDE 25; 100 MG/1; MG/1
1 TABLET ORAL EVERY MORNING
COMMUNITY

## 2024-07-10 RX ORDER — SULFASALAZINE 500 MG/1
1500 TABLET ORAL
COMMUNITY

## 2024-07-10 RX ORDER — CALCIUM CARBONATE 500(1250)
500 TABLET ORAL EVERY MORNING
COMMUNITY

## 2024-07-10 NOTE — PERIOP NOTE
[facility]                   [id number] [place] [address]   PRE-ADMISSION TESTING    [phone]     Surgery Date:  Friday, July 12, 2024       Is surgery arrival time given by surgeon?  YES  NO  If “NO”, [facility] staff will call you between 3 and 7pm the day before your surgery with your arrival time. (If your surgery is on a Monday, we will call you the Friday before.)    Call [phone] after 7pm Monday-Friday if you did not receive this call.    INSTRUCTIONS BEFORE YOUR SURGERY   When You  Arrive Arrive at the 2nd Floor Admitting Desk on the day of your surgery  Have your insurance card, photo ID, and any copayment (if needed)   Food   and   Drink NO food or drink after midnight the night before surgery    This means NO water, gum, mints, coffee, juice, etc.  No alcohol (beer, wine, liquor) 24 hours before and after surgery   Medications to   TAKE   Morning of Surgery MEDICATIONS TO TAKE THE MORNING OF SURGERY WITH A SIP OF WATER:     None    Do NOT take Losartan-HCTZ or Sulfasalazine on the morning of surgery.   Medications  To  STOP      7 days before surgery Non-Steroidal anti-inflammatory Drugs (NSAID's): for example, Ibuprofen (Advil, Motrin), Naproxen (Aleve)  Aspirin, if taking for pain   Herbal supplements, vitamins, and fish oil  (Pain medications not listed above, including Tylenol may be taken)   Blood  Thinners If you take  Aspirin, Plavix, Coumadin, or any blood-thinning or anti-blood clot medicine, talk to the doctor who prescribed the medications for pre-operative instructions.Per patient, she stopped taking Aspirin on 7/7/24   Bathing Clothing  Jewelry  Valuables     If you shower the morning of surgery, please do not apply anything to your skin (lotions, powders, deodorant, or makeup, especially mascara)  Shower with antibacterial soap daily through day of surgery.  Do not shave or trim anywhere 24 hours before surgery  Wear your hair loose or down; no

## 2024-07-11 RX ORDER — ONDANSETRON 2 MG/ML
4 INJECTION INTRAMUSCULAR; INTRAVENOUS
Status: CANCELLED | OUTPATIENT
Start: 2024-07-11 | End: 2024-07-12

## 2024-07-11 RX ORDER — NALOXONE HYDROCHLORIDE 0.4 MG/ML
INJECTION, SOLUTION INTRAMUSCULAR; INTRAVENOUS; SUBCUTANEOUS PRN
Status: CANCELLED | OUTPATIENT
Start: 2024-07-11

## 2024-07-11 RX ORDER — DIPHENHYDRAMINE HYDROCHLORIDE 50 MG/ML
12.5 INJECTION INTRAMUSCULAR; INTRAVENOUS
Status: CANCELLED | OUTPATIENT
Start: 2024-07-11 | End: 2024-07-12

## 2024-07-11 RX ORDER — SODIUM CHLORIDE, SODIUM LACTATE, POTASSIUM CHLORIDE, CALCIUM CHLORIDE 600; 310; 30; 20 MG/100ML; MG/100ML; MG/100ML; MG/100ML
INJECTION, SOLUTION INTRAVENOUS CONTINUOUS
Status: CANCELLED | OUTPATIENT
Start: 2024-07-11

## 2024-07-11 NOTE — PERIOP NOTE
Hello,     You are scheduled to have surgery tomorrow at [facility].     We would like for you to arrive at  1200 pm  We are located on the second floor, [address]. You will check-in at the registration desk located outside the elevators on the second floor prior to proceeding to [address].  Remember nothing to eat or drink after midnight. If you need to take medications the morning of surgery, please take with a few sips of water.   Wear loose, comfortable clothing and leave all your jewelry at home.   You may bring your cell phone with you.  One family member will be allowed in the pre-op area once you are dressed and your IV has been started.   You will need someone to drive you home and be with you for 24 hours post-anesthesia.     We look forward to seeing you! Call [phone] for questions after hours and [phone] between 5:30AM and 6PM.     Thanks!    [facility] ASU PREOP TEAM

## 2024-07-12 ENCOUNTER — ANESTHESIA (OUTPATIENT)
Facility: HOSPITAL | Age: 69
End: 2024-07-12
Payer: MEDICARE

## 2024-07-12 ENCOUNTER — HOSPITAL ENCOUNTER (OUTPATIENT)
Facility: HOSPITAL | Age: 69
Setting detail: OUTPATIENT SURGERY
Discharge: HOME | End: 2024-07-12
Attending: ORTHOPAEDIC SURGERY | Admitting: ORTHOPAEDIC SURGERY
Payer: MEDICARE

## 2024-07-12 ENCOUNTER — ANESTHESIA EVENT (OUTPATIENT)
Facility: HOSPITAL | Age: 69
End: 2024-07-12
Payer: MEDICARE

## 2024-07-12 VITALS
DIASTOLIC BLOOD PRESSURE: 68 MMHG | TEMPERATURE: 97 F | OXYGEN SATURATION: 99 % | SYSTOLIC BLOOD PRESSURE: 159 MMHG | HEIGHT: 64 IN | RESPIRATION RATE: 22 BRPM | HEART RATE: 79 BPM | BODY MASS INDEX: 22.47 KG/M2 | WEIGHT: 131.61 LBS

## 2024-07-12 DIAGNOSIS — M65.4 DE QUERVAIN'S DISEASE (RADIAL STYLOID TENOSYNOVITIS): Primary | ICD-10-CM

## 2024-07-12 PROCEDURE — 6360000002 HC RX W HCPCS: Performed by: PHYSICIAN ASSISTANT

## 2024-07-12 PROCEDURE — 7100000010 HC PHASE II RECOVERY - FIRST 15 MIN: Performed by: ORTHOPAEDIC SURGERY

## 2024-07-12 PROCEDURE — 2500000003 HC RX 250 WO HCPCS: Performed by: ORTHOPAEDIC SURGERY

## 2024-07-12 PROCEDURE — 6360000002 HC RX W HCPCS

## 2024-07-12 PROCEDURE — 6360000002 HC RX W HCPCS: Performed by: ORTHOPAEDIC SURGERY

## 2024-07-12 PROCEDURE — 3700000001 HC ADD 15 MINUTES (ANESTHESIA): Performed by: ORTHOPAEDIC SURGERY

## 2024-07-12 PROCEDURE — 3600000002 HC SURGERY LEVEL 2 BASE: Performed by: ORTHOPAEDIC SURGERY

## 2024-07-12 PROCEDURE — 2709999900 HC NON-CHARGEABLE SUPPLY: Performed by: ORTHOPAEDIC SURGERY

## 2024-07-12 PROCEDURE — 2500000003 HC RX 250 WO HCPCS

## 2024-07-12 PROCEDURE — 3600000012 HC SURGERY LEVEL 2 ADDTL 15MIN: Performed by: ORTHOPAEDIC SURGERY

## 2024-07-12 PROCEDURE — 3700000000 HC ANESTHESIA ATTENDED CARE: Performed by: ORTHOPAEDIC SURGERY

## 2024-07-12 PROCEDURE — 2580000003 HC RX 258: Performed by: ANESTHESIOLOGY

## 2024-07-12 PROCEDURE — 2580000003 HC RX 258: Performed by: PHYSICIAN ASSISTANT

## 2024-07-12 PROCEDURE — 7100000011 HC PHASE II RECOVERY - ADDTL 15 MIN: Performed by: ORTHOPAEDIC SURGERY

## 2024-07-12 RX ORDER — HYDROCODONE BITARTRATE AND ACETAMINOPHEN 5; 325 MG/1; MG/1
1 TABLET ORAL EVERY 6 HOURS PRN
Qty: 20 TABLET | Refills: 0 | Status: SHIPPED | OUTPATIENT
Start: 2024-07-12 | End: 2024-07-17

## 2024-07-12 RX ORDER — PROPOFOL 10 MG/ML
INJECTION, EMULSION INTRAVENOUS CONTINUOUS PRN
Status: DISCONTINUED | OUTPATIENT
Start: 2024-07-12 | End: 2024-07-12 | Stop reason: SDUPTHER

## 2024-07-12 RX ORDER — ONDANSETRON 4 MG/1
4 TABLET, FILM COATED ORAL 3 TIMES DAILY PRN
Qty: 15 TABLET | Refills: 0 | Status: SHIPPED | OUTPATIENT
Start: 2024-07-12

## 2024-07-12 RX ORDER — MIDAZOLAM HYDROCHLORIDE 1 MG/ML
INJECTION INTRAMUSCULAR; INTRAVENOUS PRN
Status: DISCONTINUED | OUTPATIENT
Start: 2024-07-12 | End: 2024-07-12 | Stop reason: SDUPTHER

## 2024-07-12 RX ORDER — MIDAZOLAM HYDROCHLORIDE 2 MG/2ML
2 INJECTION, SOLUTION INTRAMUSCULAR; INTRAVENOUS
Status: DISCONTINUED | OUTPATIENT
Start: 2024-07-12 | End: 2024-07-12 | Stop reason: HOSPADM

## 2024-07-12 RX ORDER — LIDOCAINE HYDROCHLORIDE 10 MG/ML
1 INJECTION, SOLUTION EPIDURAL; INFILTRATION; INTRACAUDAL; PERINEURAL
Status: DISCONTINUED | OUTPATIENT
Start: 2024-07-12 | End: 2024-07-12 | Stop reason: HOSPADM

## 2024-07-12 RX ORDER — SODIUM CHLORIDE, SODIUM LACTATE, POTASSIUM CHLORIDE, CALCIUM CHLORIDE 600; 310; 30; 20 MG/100ML; MG/100ML; MG/100ML; MG/100ML
INJECTION, SOLUTION INTRAVENOUS CONTINUOUS
Status: DISCONTINUED | OUTPATIENT
Start: 2024-07-12 | End: 2024-07-12 | Stop reason: HOSPADM

## 2024-07-12 RX ORDER — DOCUSATE SODIUM 100 MG/1
100 CAPSULE, LIQUID FILLED ORAL DAILY PRN
Qty: 30 CAPSULE | Refills: 0 | Status: SHIPPED | OUTPATIENT
Start: 2024-07-12

## 2024-07-12 RX ORDER — FENTANYL CITRATE 50 UG/ML
100 INJECTION, SOLUTION INTRAMUSCULAR; INTRAVENOUS
Status: DISCONTINUED | OUTPATIENT
Start: 2024-07-12 | End: 2024-07-12 | Stop reason: HOSPADM

## 2024-07-12 RX ORDER — LIDOCAINE HYDROCHLORIDE 20 MG/ML
INJECTION, SOLUTION EPIDURAL; INFILTRATION; INTRACAUDAL; PERINEURAL PRN
Status: DISCONTINUED | OUTPATIENT
Start: 2024-07-12 | End: 2024-07-12 | Stop reason: SDUPTHER

## 2024-07-12 RX ADMIN — PROPOFOL 120 MCG/KG/MIN: 10 INJECTION, EMULSION INTRAVENOUS at 13:45

## 2024-07-12 RX ADMIN — MIDAZOLAM HYDROCHLORIDE 2 MG: 1 INJECTION, SOLUTION INTRAMUSCULAR; INTRAVENOUS at 13:42

## 2024-07-12 RX ADMIN — LIDOCAINE HYDROCHLORIDE 120 MG: 20 INJECTION, SOLUTION EPIDURAL; INFILTRATION; INTRACAUDAL; PERINEURAL at 13:53

## 2024-07-12 RX ADMIN — SODIUM CHLORIDE, POTASSIUM CHLORIDE, SODIUM LACTATE AND CALCIUM CHLORIDE: 600; 310; 30; 20 INJECTION, SOLUTION INTRAVENOUS at 13:40

## 2024-07-12 RX ADMIN — WATER 2000 MG: 1 INJECTION INTRAMUSCULAR; INTRAVENOUS; SUBCUTANEOUS at 13:51

## 2024-07-12 ASSESSMENT — PAIN DESCRIPTION - DESCRIPTORS: DESCRIPTORS: ACHING

## 2024-07-12 ASSESSMENT — PAIN - FUNCTIONAL ASSESSMENT: PAIN_FUNCTIONAL_ASSESSMENT: 0-10

## 2024-07-12 ASSESSMENT — PAIN SCALES - GENERAL
PAINLEVEL_OUTOF10: 0
PAINLEVEL_OUTOF10: 0

## 2024-07-12 NOTE — ANESTHESIA PRE PROCEDURE
Department of Anesthesiology  Preprocedure Note       Name:  Layne Mcghee   Age:  69 y.o.  :  1955                                          MRN:  [id number]         Date:  2024      Surgeon: Surgeon(s):  Martin Rm MD    Procedure: Procedure(s):  RIGHT DEQUERVAINS RELEASE (REGIONAL BLOCK WITH MAC)    Medications prior to admission:   Prior to Admission medications    Medication Sig Start Date End Date Taking? Authorizing Provider   HYDROcodone-acetaminophen (NORCO) 5-325 MG per tablet Take 1 tablet by mouth every 6 hours as needed for Pain for up to 5 days. Intended supply: 5 days. Take lowest dose possible to manage pain Max Daily Amount: 4 tablets 24 Yes Flores Hackett PA-C   docusate sodium (COLACE) 100 MG capsule Take 1 capsule by mouth daily as needed for Constipation 24  Yes Flores Hackett PA-C   ondansetron (ZOFRAN) 4 MG tablet Take 1 tablet by mouth 3 times daily as needed for Nausea or Vomiting 24  Yes Flores Hackett PA-C   losartan-hydroCHLOROthiazide (HYZAAR) 100-25 MG per tablet Take 1 tablet by mouth every morning   Yes Ashwini Diego MD   sulfaSALAzine (AZULFIDINE) 500 MG tablet Take 3 tablets by mouth Daily with supper   Yes Ashwini Diego MD   calcium carbonate (OSCAL) 500 MG TABS tablet Take 1 tablet by mouth every morning   Yes Ashwini Diego MD   Probiotic Product (PROBIOTIC PO) Take by mouth every morning   Yes Ashwini Diego MD   Multiple Vitamin (MULTIVITAMIN PO) Take by mouth every morning   Yes Ashwini Diego MD   IBUPROFEN PO Take by mouth as needed   Yes Ashwini Diego MD   aspirin 81 MG EC tablet Take 1 tablet by mouth nightly    Automatic Reconciliation, Ar   folic acid (FOLVITE) 1 MG tablet Take 1 tablet by mouth every morning    Automatic Reconciliation, Ar   sulfaSALAzine (AZULFIDINE) 500 MG tablet Take 2 tablets by mouth every morning Patient takes after eating breakfast.    Automatic

## 2024-07-12 NOTE — ANESTHESIA POSTPROCEDURE EVALUATION
Department of Anesthesiology  Postprocedure Note    Patient: Layne Mcghee  MRN: [id number]  YOB: 1955  Date of evaluation: 7/12/2024    Procedure Summary       Date: 07/12/24 Room / Location: [facility] ASU OR  / [facility] AMBULATORY OR    Anesthesia Start: 1340 Anesthesia Stop: 1416    Procedure: RIGHT DEQUERVAINS RELEASE (REGIONAL BLOCK WITH MAC) (Right: Wrist) Diagnosis:       De Quervain's tenosynovitis      (De Quervain's tenosynovitis [M65.4])    Surgeons: Martin Rm MD Responsible Provider: Abdoulaye Hsu MD    Anesthesia Type: MAC ASA Status: 2            Anesthesia Type: No value filed.    Deniz Phase I: Deniz Score: 10    Deniz Phase II:      Anesthesia Post Evaluation    Patient location during evaluation: PACU  Patient participation: complete - patient participated  Level of consciousness: awake  Airway patency: patent  Nausea & Vomiting: no vomiting and no nausea  Cardiovascular status: hemodynamically stable  Respiratory status: acceptable  Hydration status: stable  Pain management: adequate    No notable events documented.

## 2024-07-12 NOTE — OP NOTE
Operative Note    Preoperative Diagnosis:  Left DeQuervain's tenosynovitis    Postoperative Diagnosis: Left DeQuervain's tenosynovitis    Procedure:  Left DeQuervain's release    Surgeon:  Martin Rm MD    Assistant: Flores Hackett PA-C    Explanation of necessity of assistant: An assistant was necessary for this case with assistance in retraction and positioning to achieve appropriate exposure during the case.    Anesthesia:   MAC with local    Estimated blood loss: Minimal.     Complications: None.    Pathology: None.     Findings:  Tendinosis of the 1st dorsal compartment tendons     Indications: Layne Mcghee is a 69 y.o. female who presented with DeQuervain's tenosynovitis which has been unresponsive to nonoperative measures. After discussion of risks, including risks of bleeding, infection, damage to surrounding structures, persistent pain, stiffness, and loss of function, risks of anesthesia, and other risks, the patient elected to proceed with the above procedure and signed informed operative consent.    Description of procedure: The patient was seen and identified in the preanesthesia care unit. The operative site was marked. Preoperative questions were invited and answered. The patient was then evaluated by the anesthesia team and brought to the operative suite on a stretcher . Light sedation was induced. The patient was then prepped and draped in the usual sterile fashion.  A timeout was completed confirming the appropriate site, side, and procedure. Then, a field block with Marcaine 0.5% and Lidocaine 1% with epinephrine was placed over the planned incision. Then, an Esmarch bandage was used to exsanguinate the limb, and a well-padded tourniquet was inflated to 250 mmHg.      A longitudinal incision was made starting from the radial styloid and proceeding proximally.  The superficial radial nerve was identified and protected. The interval between the first and second dorsal extensor retinaculum was

## 2024-07-12 NOTE — H&P
Orthopedic Admission History and Physical        NAME: Layne Mcghee       :  1955       MRN:  [id number]      Subjective:     Patient is a 69 y.o. female who presents with history of right de Quervain's tenosynovitis.  Presents today for surgical treatment.    Patient Active Problem List    Diagnosis Date Noted    Peripheral vascular disease (HCC) 2020    Pyoderma gangrenosa 2011    UC (ulcerative colitis) (HCC) 2011    Hypertension      Past Medical History:   Diagnosis Date    COVID     De Quervain's syndrome (tenosynovitis)     Hypertension     Pneumonia     Pyodermic gangrenosum     related to Ulcerative Colitis    Ulcerative colitis (HCC)       Past Surgical History:   Procedure Laterality Date     SECTION      x1    COLONOSCOPY N/A 2021    COLONOSCOPY  :- performed by Bryant Schwartz MD at [facility] ENDOSCOPY    COLONOSCOPY N/A 3/9/2023    COLONOSCOPY performed by Bryant Schwartz MD at [facility] ENDOSCOPY    COLONOSCOPY  2014    COLONOSCOPY N/A 2016    COLONOSCOPY performed by Bryant Schwartz MD at [facility] ENDOSCOPY    COLONOSCOPY N/A 2018    COLONOSCOPY performed by Bryant Schwartz MD at [facility] ENDOSCOPY    HYSTERECTOMY (CERVIX STATUS UNKNOWN)        Prior to Admission medications    Medication Sig Start Date End Date Taking? Authorizing Provider   losartan-hydroCHLOROthiazide (HYZAAR) 100-25 MG per tablet Take 1 tablet by mouth every morning   Yes Ashwini Diego MD   sulfaSALAzine (AZULFIDINE) 500 MG tablet Take 3 tablets by mouth Daily with supper   Yes Ashwini Diego MD   calcium carbonate (OSCAL) 500 MG TABS tablet Take 1 tablet by mouth every morning   Yes Ashwini Diego MD   Probiotic Product (PROBIOTIC PO) Take by mouth every morning   Yes Ashwini Diego MD   Multiple Vitamin (MULTIVITAMIN PO) Take by mouth every morning   Yes Ashwini Diego MD   Acetaminophen (TYLENOL PO) Take by mouth as needed   Yes Provider

## 2024-07-12 NOTE — BRIEF OP NOTE
Brief Postoperative Note      Patient: Layne Mcghee  YOB: 1955  MRN: [id number]    Date of Procedure: 7/12/2024    Pre-Op Diagnosis Codes:     * De Quervain's tenosynovitis [M65.4]    Post-Op Diagnosis: Same       Procedure(s):  RIGHT DEQUERVAINS RELEASE (REGIONAL BLOCK WITH MAC)    Surgeon(s):  Martin Rm MD    Assistant:  Surgical Assistant: Hansa Ngo  Physician Assistant: Flores Hackett PA-C    Anesthesia: Monitor Anesthesia Care    Estimated Blood Loss (mL): Minimal    Complications: None    Specimens:   * No specimens in log *    Implants:  * No implants in log *      Drains: * No LDAs found *    Findings:  Infection Present At Time Of Surgery (PATOS) (choose all levels that have infection present):  No infection present  Other Findings: As above.    Electronically signed by Flores Hackett PA-C on 7/12/2024 at 2:11 PM

## 2024-07-22 NOTE — DISCHARGE INSTRUCTIONS
Upper Extremity Surgery Discharge Instructions  Dr. Martin Rm / Flores Hackett PA-C        Please take the time to review the following instructions before you leave the surgical center and use them as guidelines during your recovery from surgery. If you have any questions, the most efficient way to contact us is via the messaging portal on Ep[employer]. The [employer] team works on checking and answering these messages throughout the workday to get back with you as quickly as possible. After hours and on weekends (outside of 8am-5pm Monday-Friday) please call [phone] to speak with the on-call provider.      Wound Care / Dressing Change   Do NOT remove your dressing or get them wet.       Showering / Bathing   You may bathe/shower as long as dressing/splint/cast is kept dry.        Sling   If you had a nerve block done (the entire arm is numb), please wear a sling for comfort and support until the nerve block wears off. Once the nerve block has worn off and you are able to move the arm again, you are not required to wear a sling and should only do so as needed for comfort.        Activity   No lifting with affected hand.   Please begin using fingers immediately after surgery, working to improve finger motion.       Ice and Elevation   Please use ice consistently over the operative site for 48 hours after surgery. After 48 hours, you should ice 3 times per day for 20 minutes at a time for the next 5 days. One week after surgery, you may use ice as needed for pain.        Diet   You may advance your regular diet as tolerated. Increase your clear liquid intake for the next 2-3 days.        Driving  Please do NOT drive while taking prescription pain medication (opioids).   Please do NOT drive during the post-operative period during which you are in your post-op dressing/splint/cast. While it is not illegal to drive with an injured or post-surgical hand/arm, one often has delayed reaction  injuries: optimizing recovery (2007) Best Practice & Research Clinical Rheumatology Vol. 21, No. 2, pp 317-331, Accessed 9/26/11    PRICE first aid guidelines - Protection, Rest, Ice, Compression and Elevation By Madhavi Pizarro [employer]com Guide, Updated March 27, 2011, Accessed 9/26/11 http://sportsmedicine.Snip2Code.com/cs/rehab/a/rice.htm    Rest Ice Compression Elevation: RICE for injuries, Accessed 9/26/11 http://www.[employer]quare/rest-ice-compression-elevation.html    The use of ice in the treatment of acute soft-tissue injury (2004) American Journal of Sports Medicine, Vol. 32, No. 1, pp 251-261, Accessed 9/26/11 http://ajs.[employer] Worldwideub.com/content/32/1/251.full.pdf+html    Soft tissue damage and healing; theory and techniques, www.iaaf.org, Ch. 9 of  medical page, by JOANNE Beasley, BRITT Altamirano, philipp Lock And TAMMI Choi, Accessed 9/27/11 http://www.iaaf.org/mm/Document/imported/69136.pdf

## 2024-07-25 ENCOUNTER — ANESTHESIA EVENT (OUTPATIENT)
Facility: HOSPITAL | Age: 69
End: 2024-07-25
Payer: MEDICARE

## 2024-07-25 RX ORDER — NALOXONE HYDROCHLORIDE 0.4 MG/ML
INJECTION, SOLUTION INTRAMUSCULAR; INTRAVENOUS; SUBCUTANEOUS PRN
Status: CANCELLED | OUTPATIENT
Start: 2024-07-25

## 2024-07-25 RX ORDER — DIPHENHYDRAMINE HYDROCHLORIDE 50 MG/ML
12.5 INJECTION INTRAMUSCULAR; INTRAVENOUS
Status: CANCELLED | OUTPATIENT
Start: 2024-07-25 | End: 2024-07-26

## 2024-07-25 RX ORDER — SODIUM CHLORIDE, SODIUM LACTATE, POTASSIUM CHLORIDE, CALCIUM CHLORIDE 600; 310; 30; 20 MG/100ML; MG/100ML; MG/100ML; MG/100ML
INJECTION, SOLUTION INTRAVENOUS CONTINUOUS
Status: CANCELLED | OUTPATIENT
Start: 2024-07-25

## 2024-07-25 RX ORDER — ONDANSETRON 2 MG/ML
4 INJECTION INTRAMUSCULAR; INTRAVENOUS
Status: CANCELLED | OUTPATIENT
Start: 2024-07-25 | End: 2024-07-26

## 2024-07-26 ENCOUNTER — ANESTHESIA (OUTPATIENT)
Facility: HOSPITAL | Age: 69
End: 2024-07-26
Payer: MEDICARE

## 2024-07-26 ENCOUNTER — HOSPITAL ENCOUNTER (OUTPATIENT)
Facility: HOSPITAL | Age: 69
Setting detail: OUTPATIENT SURGERY
Discharge: HOME | End: 2024-07-26
Attending: ORTHOPAEDIC SURGERY | Admitting: ORTHOPAEDIC SURGERY
Payer: MEDICARE

## 2024-07-26 VITALS
BODY MASS INDEX: 22.92 KG/M2 | RESPIRATION RATE: 18 BRPM | OXYGEN SATURATION: 95 % | HEART RATE: 80 BPM | TEMPERATURE: 98.1 F | DIASTOLIC BLOOD PRESSURE: 69 MMHG | SYSTOLIC BLOOD PRESSURE: 159 MMHG | WEIGHT: 134.26 LBS | HEIGHT: 64 IN

## 2024-07-26 DIAGNOSIS — M65.4 DE QUERVAIN'S TENOSYNOVITIS, LEFT: Primary | ICD-10-CM

## 2024-07-26 PROCEDURE — 6360000002 HC RX W HCPCS: Performed by: NURSE ANESTHETIST, CERTIFIED REGISTERED

## 2024-07-26 PROCEDURE — 3700000001 HC ADD 15 MINUTES (ANESTHESIA): Performed by: ORTHOPAEDIC SURGERY

## 2024-07-26 PROCEDURE — 3600000012 HC SURGERY LEVEL 2 ADDTL 15MIN: Performed by: ORTHOPAEDIC SURGERY

## 2024-07-26 PROCEDURE — 6360000002 HC RX W HCPCS: Performed by: ORTHOPAEDIC SURGERY

## 2024-07-26 PROCEDURE — 6360000002 HC RX W HCPCS: Performed by: PHYSICIAN ASSISTANT

## 2024-07-26 PROCEDURE — 2709999900 HC NON-CHARGEABLE SUPPLY: Performed by: ORTHOPAEDIC SURGERY

## 2024-07-26 PROCEDURE — 2580000003 HC RX 258: Performed by: ANESTHESIOLOGY

## 2024-07-26 PROCEDURE — 7100000010 HC PHASE II RECOVERY - FIRST 15 MIN: Performed by: ORTHOPAEDIC SURGERY

## 2024-07-26 PROCEDURE — 2580000003 HC RX 258: Performed by: PHYSICIAN ASSISTANT

## 2024-07-26 PROCEDURE — 3600000002 HC SURGERY LEVEL 2 BASE: Performed by: ORTHOPAEDIC SURGERY

## 2024-07-26 PROCEDURE — 2500000003 HC RX 250 WO HCPCS: Performed by: ORTHOPAEDIC SURGERY

## 2024-07-26 PROCEDURE — 7100000011 HC PHASE II RECOVERY - ADDTL 15 MIN: Performed by: ORTHOPAEDIC SURGERY

## 2024-07-26 PROCEDURE — 3700000000 HC ANESTHESIA ATTENDED CARE: Performed by: ORTHOPAEDIC SURGERY

## 2024-07-26 PROCEDURE — 7100000000 HC PACU RECOVERY - FIRST 15 MIN: Performed by: ORTHOPAEDIC SURGERY

## 2024-07-26 PROCEDURE — 7100000001 HC PACU RECOVERY - ADDTL 15 MIN: Performed by: ORTHOPAEDIC SURGERY

## 2024-07-26 RX ORDER — SODIUM CHLORIDE, SODIUM LACTATE, POTASSIUM CHLORIDE, CALCIUM CHLORIDE 600; 310; 30; 20 MG/100ML; MG/100ML; MG/100ML; MG/100ML
INJECTION, SOLUTION INTRAVENOUS CONTINUOUS
Status: DISCONTINUED | OUTPATIENT
Start: 2024-07-26 | End: 2024-07-26 | Stop reason: HOSPADM

## 2024-07-26 RX ORDER — MIDAZOLAM HYDROCHLORIDE 1 MG/ML
INJECTION INTRAMUSCULAR; INTRAVENOUS PRN
Status: DISCONTINUED | OUTPATIENT
Start: 2024-07-26 | End: 2024-07-26 | Stop reason: SDUPTHER

## 2024-07-26 RX ORDER — FENTANYL CITRATE 50 UG/ML
100 INJECTION, SOLUTION INTRAMUSCULAR; INTRAVENOUS
Status: DISCONTINUED | OUTPATIENT
Start: 2024-07-26 | End: 2024-07-26 | Stop reason: HOSPADM

## 2024-07-26 RX ORDER — DOCUSATE SODIUM 100 MG/1
100 CAPSULE, LIQUID FILLED ORAL DAILY PRN
Qty: 30 CAPSULE | Refills: 0 | Status: SHIPPED | OUTPATIENT
Start: 2024-07-26

## 2024-07-26 RX ORDER — FENTANYL CITRATE 50 UG/ML
INJECTION, SOLUTION INTRAMUSCULAR; INTRAVENOUS PRN
Status: DISCONTINUED | OUTPATIENT
Start: 2024-07-26 | End: 2024-07-26 | Stop reason: SDUPTHER

## 2024-07-26 RX ORDER — MIDAZOLAM HYDROCHLORIDE 2 MG/2ML
2 INJECTION, SOLUTION INTRAMUSCULAR; INTRAVENOUS
Status: DISCONTINUED | OUTPATIENT
Start: 2024-07-26 | End: 2024-07-26 | Stop reason: HOSPADM

## 2024-07-26 RX ORDER — ASCORBIC ACID 500 MG
500 TABLET ORAL DAILY
COMMUNITY

## 2024-07-26 RX ORDER — TRAMADOL HYDROCHLORIDE 50 MG/1
50-100 TABLET ORAL EVERY 6 HOURS PRN
Qty: 30 TABLET | Refills: 0 | Status: SHIPPED | OUTPATIENT
Start: 2024-07-26 | End: 2024-07-31

## 2024-07-26 RX ORDER — LIDOCAINE HYDROCHLORIDE 10 MG/ML
1 INJECTION, SOLUTION EPIDURAL; INFILTRATION; INTRACAUDAL; PERINEURAL
Status: DISCONTINUED | OUTPATIENT
Start: 2024-07-26 | End: 2024-07-26 | Stop reason: HOSPADM

## 2024-07-26 RX ORDER — ONDANSETRON 4 MG/1
4 TABLET, ORALLY DISINTEGRATING ORAL 3 TIMES DAILY PRN
Qty: 21 TABLET | Refills: 0 | Status: SHIPPED | OUTPATIENT
Start: 2024-07-26

## 2024-07-26 RX ORDER — PROPOFOL 10 MG/ML
INJECTION, EMULSION INTRAVENOUS CONTINUOUS PRN
Status: DISCONTINUED | OUTPATIENT
Start: 2024-07-26 | End: 2024-07-26 | Stop reason: SDUPTHER

## 2024-07-26 RX ADMIN — FENTANYL CITRATE 25 MCG: 50 INJECTION, SOLUTION INTRAMUSCULAR; INTRAVENOUS at 12:55

## 2024-07-26 RX ADMIN — FENTANYL CITRATE 25 MCG: 50 INJECTION, SOLUTION INTRAMUSCULAR; INTRAVENOUS at 12:58

## 2024-07-26 RX ADMIN — WATER 2000 MG: 1 INJECTION INTRAMUSCULAR; INTRAVENOUS; SUBCUTANEOUS at 13:00

## 2024-07-26 RX ADMIN — PROPOFOL 75 MCG/KG/MIN: 10 INJECTION, EMULSION INTRAVENOUS at 12:58

## 2024-07-26 RX ADMIN — FENTANYL CITRATE 25 MCG: 50 INJECTION, SOLUTION INTRAMUSCULAR; INTRAVENOUS at 13:27

## 2024-07-26 RX ADMIN — SODIUM CHLORIDE, POTASSIUM CHLORIDE, SODIUM LACTATE AND CALCIUM CHLORIDE: 600; 310; 30; 20 INJECTION, SOLUTION INTRAVENOUS at 12:10

## 2024-07-26 RX ADMIN — MIDAZOLAM HYDROCHLORIDE 2 MG: 1 INJECTION, SOLUTION INTRAMUSCULAR; INTRAVENOUS at 12:53

## 2024-07-26 RX ADMIN — FENTANYL CITRATE 25 MCG: 50 INJECTION, SOLUTION INTRAMUSCULAR; INTRAVENOUS at 13:05

## 2024-07-26 ASSESSMENT — PAIN - FUNCTIONAL ASSESSMENT: PAIN_FUNCTIONAL_ASSESSMENT: NONE - DENIES PAIN

## 2024-07-26 NOTE — BRIEF OP NOTE
Brief Postoperative Note      Patient: Layne Mcghee  YOB: 1955  MRN: [id number]    Date of Procedure: 7/26/2024    Pre-Op Diagnosis Codes:     * De Quervain's tenosynovitis [M65.4]    Post-Op Diagnosis: Same       Procedure(s):  LEFT DEQUERVAINS RELEASE (MAC WITH REGIONAL BLOCK)    Surgeon(s):  Martin Rm MD    Assistant:  * No surgical staff found *    Anesthesia: Monitor Anesthesia Care    Estimated Blood Loss (mL): less than 50     Complications: None    Specimens:   * No specimens in log *    Implants:  * No implants in log *      Drains: * No LDAs found *    Findings:  Infection Present At Time Of Surgery (PATOS) (choose all levels that have infection present):  No infection present  Other Findings: As above.     Electronically signed by Flores Hackett PA-C on 7/26/2024 at 1:34 PM

## 2024-07-26 NOTE — H&P
Orthopedic Admission History and Physical        NAME: Layne Mcghee       :  1955       MRN:  [id number]      Subjective:     Patient is a 69 y.o. female who presents with history of left de Quervain's tenosynovitis.  Presents today for surgical treatment.    Patient Active Problem List    Diagnosis Date Noted    Peripheral vascular disease (HCC) 2020    Pyoderma gangrenosa 2011    UC (ulcerative colitis) (HCC) 2011    Hypertension      Past Medical History:   Diagnosis Date    COVID     De Quervain's syndrome (tenosynovitis)     Hypertension     Pneumonia     Pyodermic gangrenosum     related to Ulcerative Colitis    Ulcerative colitis (HCC)       Past Surgical History:   Procedure Laterality Date     SECTION      x1    COLONOSCOPY N/A 2021    COLONOSCOPY  :- performed by Bryant Schwartz MD at [facility] ENDOSCOPY    COLONOSCOPY N/A 3/9/2023    COLONOSCOPY performed by Bryant Schwartz MD at [facility] ENDOSCOPY    COLONOSCOPY      COLONOSCOPY N/A 2016    COLONOSCOPY performed by Bryant Schwartz MD at [facility] ENDOSCOPY    COLONOSCOPY N/A 2018    COLONOSCOPY performed by Bryant Schwartz MD at [facility] ENDOSCOPY    HYSTERECTOMY (CERVIX STATUS UNKNOWN)      WRIST SURGERY Right 2024    RIGHT DEQUERVAINS RELEASE (REGIONAL BLOCK WITH MAC) performed by Martin Rm MD at [facility] AMBULATORY OR      Prior to Admission medications    Medication Sig Start Date End Date Taking? Authorizing Provider   vitamin C (ASCORBIC ACID) 500 MG tablet Take 1 tablet by mouth daily   Yes Provider, MD Ashwini   traMADol (ULTRAM) 50 MG tablet Take 1-2 tablets by mouth every 6 hours as needed for Pain for up to 5 days. Intended supply: 3 days. Take lowest dose possible to manage pain Max Daily Amount: 400 mg 24 Yes Flores Hackett PA-C   docusate sodium (COLACE) 50 MG capsule Take 1 capsule by mouth daily as needed for Constipation 24  Yes Flores Hackett PA-C  drinks of alcohol     Comment: social only      Family History   Problem Relation Age of Onset    Hypertension Mother     Cancer Father         lung cancer        Review of Systems  A comprehensive review of systems was negative except for that written in the HPI.    Objective:     Patient Vitals for the past 8 hrs:   BP Temp Temp src Pulse Resp SpO2 Height Weight   24 1115 (!) 149/66 98.1 °F (36.7 °C) Oral 71 16 97 % 1.626 m (5' 4\") 60.9 kg (134 lb 4.2 oz)     Temp (24hrs), Av.1 °F (36.7 °C), Min:98.1 °F (36.7 °C), Max:98.1 °F (36.7 °C)      Physical Exam:  General appearance: alert, appears stated age, and cooperative  Lungs: No use of accessory breathing muscles.  Breathing unlabored.  Cardiac: Regular rate.  Abdomen: soft, non-tender, non-distended  Extremities: As per prior exam.       Labs: No results found for this or any previous visit (from the past 24 hour(s)).    Assessment:   No medical contraindications to proceeding with planned surgery.  Please see initial office note for full discussion of risks, benefits, and alternatives to surgery.    Patient Active Problem List    Diagnosis Date Noted    Peripheral vascular disease (HCC) 2020    Pyoderma gangrenosa 2011    UC (ulcerative colitis) (HCC) 2011    Hypertension          Plan:   Proceed with surgery  Pt. stable  Pt. NPO x meds

## 2024-07-26 NOTE — ANESTHESIA POSTPROCEDURE EVALUATION
Department of Anesthesiology  Postprocedure Note    Patient: Layne Mcghee  MRN: [id number]  YOB: 1955  Date of evaluation: 7/26/2024    Procedure Summary       Date: 07/26/24 Room / Location: [facility] MAIN OR  / [facility] MAIN OR    Anesthesia Start: 1253 Anesthesia Stop: 1338    Procedure: LEFT DEQUERVAINS RELEASE (MAC WITH REGIONAL BLOCK) (Left: Hand) Diagnosis:       De Quervain's tenosynovitis      (De Quervain's tenosynovitis [M65.4])    Surgeons: Martin Rm MD Responsible Provider: Randy Pierre MD    Anesthesia Type: MAC ASA Status: 2            Anesthesia Type: MAC    Deniz Phase I: Deniz Score: 7    Deniz Phase II:      Anesthesia Post Evaluation    No notable events documented.

## 2024-07-26 NOTE — ANESTHESIA PRE PROCEDURE
Department of Anesthesiology  Preprocedure Note       Name:  Layne Mcghee   Age:  69 y.o.  :  1955                                          MRN:  [id number]         Date:  2024      Surgeon: Surgeon(s):  Martin Rm MD    Procedure: Procedure(s):  LEFT DEQUERVAINS RELEASE (MAC WITH REGIONAL BLOCK)    Medications prior to admission:   Prior to Admission medications    Medication Sig Start Date End Date Taking? Authorizing Provider   vitamin C (ASCORBIC ACID) 500 MG tablet Take 1 tablet by mouth daily   Yes Ashwini Diego MD   docusate sodium (COLACE) 100 MG capsule Take 1 capsule by mouth daily as needed for Constipation  Patient not taking: Reported on 2024   Flores Hackett PA-C   ondansetron (ZOFRAN) 4 MG tablet Take 1 tablet by mouth 3 times daily as needed for Nausea or Vomiting  Patient not taking: Reported on 2024   Flores Hackett PA-C   losartan-hydroCHLOROthiazide (HYZAAR) 100-25 MG per tablet Take 1 tablet by mouth every morning    Ashwini Diego MD   sulfaSALAzine (AZULFIDINE) 500 MG tablet Take 3 tablets by mouth Daily with supper    Ashwini Diego MD   calcium carbonate (OSCAL) 500 MG TABS tablet Take 1 tablet by mouth every morning    Ashwini Diego MD   Probiotic Product (PROBIOTIC PO) Take by mouth every morning    Ashwini Diego MD   Multiple Vitamin (MULTIVITAMIN PO) Take by mouth every morning    Ashwini Diego MD   IBUPROFEN PO Take by mouth as needed    Ashwini Diego MD   aspirin 81 MG EC tablet Take 1 tablet by mouth nightly    Automatic Reconciliation, Ar   folic acid (FOLVITE) 1 MG tablet Take 1 tablet by mouth every morning    Automatic Reconciliation, Ar   sulfaSALAzine (AZULFIDINE) 500 MG tablet Take 2 tablets by mouth every morning Patient takes after eating breakfast.    Automatic Reconciliation, Ar       Current medications:    Current Facility-Administered Medications   Medication

## 2024-07-26 NOTE — OP NOTE
Orthopedic Admission History and Physical        NAME: Layne Mcghee       :  1955       MRN:  [id number]      Subjective:     Patient is a 69 y.o. female who presents with history of L DeQuervain's.  Presents today for surgical treatment.    Patient Active Problem List    Diagnosis Date Noted    Peripheral vascular disease (HCC) 2020    Pyoderma gangrenosa 2011    UC (ulcerative colitis) (HCC) 2011    Hypertension      Past Medical History:   Diagnosis Date    COVID     De Quervain's syndrome (tenosynovitis)     Hypertension     Pneumonia     Pyodermic gangrenosum     related to Ulcerative Colitis    Ulcerative colitis (HCC)       Past Surgical History:   Procedure Laterality Date     SECTION      x1    COLONOSCOPY N/A 2021    COLONOSCOPY  :- performed by Bryant Schwartz MD at [facility] ENDOSCOPY    COLONOSCOPY N/A 3/9/2023    COLONOSCOPY performed by Bryant Schwartz MD at [facility] ENDOSCOPY    COLONOSCOPY  2014    COLONOSCOPY N/A 2016    COLONOSCOPY performed by Bryant Schwartz MD at [facility] ENDOSCOPY    COLONOSCOPY N/A 2018    COLONOSCOPY performed by Bryant Schwartz MD at [facility] ENDOSCOPY    HYSTERECTOMY (CERVIX STATUS UNKNOWN)      WRIST SURGERY Right 2024    RIGHT DEQUERVAINS RELEASE (REGIONAL BLOCK WITH MAC) performed by Martin Rm MD at [facility] AMBULATORY OR      Prior to Admission medications    Medication Sig Start Date End Date Taking? Authorizing Provider   vitamin C (ASCORBIC ACID) 500 MG tablet Take 1 tablet by mouth daily   Yes Provider, MD Ashwini   traMADol (ULTRAM) 50 MG tablet Take 1-2 tablets by mouth every 6 hours as needed for Pain for up to 5 days. Intended supply: 3 days. Take lowest dose possible to manage pain Max Daily Amount: 400 mg 24 Yes Flores Hackett PA-C   docusate sodium (COLACE) 50 MG capsule Take 1 capsule by mouth daily as needed for Constipation 24  Yes Flores Hackett PA-C   docusate sodium  (COLACE) 100 MG capsule Take 1 capsule by mouth daily as needed for Constipation 7/26/24  Yes Flores Hackett PA-C   ondansetron (ZOFRAN-ODT) 4 MG disintegrating tablet Take 1 tablet by mouth 3 times daily as needed for Nausea or Vomiting 7/26/24  Yes Flores Hackett PA-C   losartan-hydroCHLOROthiazide (HYZAAR) 100-25 MG per tablet Take 1 tablet by mouth every morning    Ashwini Diego MD   sulfaSALAzine (AZULFIDINE) 500 MG tablet Take 3 tablets by mouth Daily with supper    Ashwini Diego MD   calcium carbonate (OSCAL) 500 MG TABS tablet Take 1 tablet by mouth every morning    Ashwini Diego MD   Probiotic Product (PROBIOTIC PO) Take by mouth every morning    Ashwini Diego MD   Multiple Vitamin (MULTIVITAMIN PO) Take by mouth every morning    Ashwini Diego MD   IBUPROFEN PO Take by mouth as needed    Ashwini Diego MD   aspirin 81 MG EC tablet Take 1 tablet by mouth nightly    Automatic Reconciliation, Ar   folic acid (FOLVITE) 1 MG tablet Take 1 tablet by mouth every morning    Automatic Reconciliation, Ar   sulfaSALAzine (AZULFIDINE) 500 MG tablet Take 2 tablets by mouth every morning Patient takes after eating breakfast.    Automatic Reconciliation, Ar     Current Facility-Administered Medications   Medication Dose Route Frequency    ceFAZolin (ANCEF) 2,000 mg in sterile water 20 mL IV syringe  2,000 mg IntraVENous Once    lidocaine PF 1 % injection 1 mL  1 mL IntraDERmal Once PRN    fentaNYL (SUBLIMAZE) injection 100 mcg  100 mcg IntraVENous Once PRN    lactated ringers IV soln infusion   IntraVENous Continuous    midazolam PF (VERSED) injection 2 mg  2 mg IntraVENous Once PRN      Allergies   Allergen Reactions    Ace Inhibitors Cough      Social History     Tobacco Use    Smoking status: Never     Passive exposure: Past    Smokeless tobacco: Never   Substance Use Topics    Alcohol use: Not Currently     Alcohol/week: 0.0 - 2.0 standard drinks of alcohol

## 2024-07-26 NOTE — OP NOTE
Operative Note    Preoperative Diagnosis:  Left DeQuervain's tenosynovitis    Postoperative Diagnosis: Left DeQuervain's tenosynovitis    Procedure:  Left DeQuervain's release    Surgeon:  Martin Rm MD    Assistant: Flores Hackett PA-C    Explanation of necessity of assistant: An assistant was necessary for this case with assistance in retraction and positioning to achieve appropriate exposure during the case.    Anesthesia:  MAC with local    Estimated blood loss: Minimal.     Complications: None.    Pathology: None.     Findings:  Tendinosis of the 1st dorsal compartment tendons there was a subcompartment for the EPB    Indications: Layne cMghee is a 69 y.o. female who presented with DeQuervain's tenosynovitis which has been unresponsive to nonoperative measures. After discussion of risks, including risks of bleeding, infection, damage to surrounding structures, persistent pain, stiffness, and loss of function, risks of anesthesia, and other risks, the patient elected to proceed with the above procedure and signed informed operative consent.    Description of procedure: The patient was seen and identified in the preanesthesia care unit. The operative site was marked. Preoperative questions were invited and answered. The patient was then evaluated by the anesthesia team and brought to the operative suite on a stretcher . Light sedation was induced. The patient was then prepped and draped in the usual sterile fashion.  Ancef was given.. A timeout was completed confirming the appropriate site, side, and procedure. Then, a field block with Marcaine 0.5% and Lidocaine 1% with epinephrine was placed over the planned incision. Then, an Esmarch bandage was used to exsanguinate the limb, and a well-padded tourniquet was inflated to 250 mmHg.      A longitudinal incision was made starting from the radial styloid and proceeding proximally.  The superficial radial nerve was identified and protected. The interval between

## 2024-09-10 ENCOUNTER — TRANSCRIBE ORDERS (OUTPATIENT)
Facility: HOSPITAL | Age: 69
End: 2024-09-10

## 2024-09-10 DIAGNOSIS — Z12.31 VISIT FOR SCREENING MAMMOGRAM: Primary | ICD-10-CM

## 2024-10-17 ENCOUNTER — HOSPITAL ENCOUNTER (OUTPATIENT)
Facility: HOSPITAL | Age: 69
Discharge: HOME | End: 2024-10-20
Attending: FAMILY MEDICINE
Payer: MEDICARE

## 2024-10-17 VITALS — WEIGHT: 134 LBS | BODY MASS INDEX: 22.88 KG/M2 | HEIGHT: 64 IN

## 2024-10-17 DIAGNOSIS — Z12.31 VISIT FOR SCREENING MAMMOGRAM: ICD-10-CM

## 2024-10-17 PROCEDURE — 77063 BREAST TOMOSYNTHESIS BI: CPT

## 2024-11-12 ENCOUNTER — OFFICE VISIT (OUTPATIENT)
Facility: CLINIC | Age: 69
End: 2024-11-12

## 2024-11-12 VITALS
SYSTOLIC BLOOD PRESSURE: 130 MMHG | OXYGEN SATURATION: 95 % | DIASTOLIC BLOOD PRESSURE: 67 MMHG | HEART RATE: 88 BPM | WEIGHT: 135 LBS | BODY MASS INDEX: 23.05 KG/M2 | HEIGHT: 64 IN | TEMPERATURE: 97.2 F | RESPIRATION RATE: 16 BRPM

## 2024-11-12 DIAGNOSIS — I10 PRIMARY HYPERTENSION: Primary | ICD-10-CM

## 2024-11-12 DIAGNOSIS — E55.9 VITAMIN D DEFICIENCY: ICD-10-CM

## 2024-11-12 DIAGNOSIS — E78.49 OTHER HYPERLIPIDEMIA: ICD-10-CM

## 2024-11-12 PROBLEM — N95.1 MENOPAUSAL SYNDROME: Status: ACTIVE | Noted: 2024-06-03

## 2024-11-12 RX ORDER — LOSARTAN POTASSIUM AND HYDROCHLOROTHIAZIDE 12.5; 5 MG/1; MG/1
1 TABLET ORAL DAILY
Qty: 90 TABLET | Refills: 3 | Status: SHIPPED | OUTPATIENT
Start: 2024-11-12

## 2024-11-12 NOTE — PROGRESS NOTES
SUBJECTIVES  with respective ASSESSMENT/PLAN:  Ms. Layne Mcghee is a 69 y.o. female established patient who presents for New Patient (New pt from Dr. Velázquez.  Pt is doing great and really just needs refill so on blood pressure medication. [facility] is pretty good. /)  , found to have the followin. Primary hypertension  Overview:  BP Readings from Last 3 Encounters:   24 130/67   24 (!) 159/69   24 (!) 159/68     Medication: losartan-HCTZ 50-12.5mg daily    Interval Hx:  - had run out of the 100-25 mg tablets so went back down on dosage but seems to be doing well with her BP  - home BP is usually better than today.  Assessment & Plan:   Chronic, at goal (stable), continue current treatment plan and refill meds.  Orders:  -     losartan-hydroCHLOROthiazide (HYZAAR) 50-12.5 MG per tablet; Take 1 tablet by mouth daily, Disp-90 tablet, R-3Normal  -     CBC; Future  -     Comprehensive Metabolic Panel; Future  -     Thyroid Cascade Profile; Future  -     Microalbumin / Creatinine Urine Ratio; Future  2. Other hyperlipidemia  -     Lipoprotein A (LPA); Future  -     Lipoprotein NMR; Future  3. Vitamin D deficiency  -     Vitamin D 25 Hydroxy; Future        It was a pleasure seeing Ms. Layne Mcghee today.    Return in about 4 months (around 3/12/2025) for Medicare AWV/lab review.      The following portions of the patient's history were reviewed and updated as appropriate: allergies, current medications, family history, medical history, social history, surgical history and problem list.    OBJECTIVE FINDINGS:    /67   Pulse 88   Temp 97.2 °F (36.2 °C)   Resp 16   Ht 1.626 m (5' 4\")   Wt 61.2 kg (135 lb)   SpO2 95%   BMI 23.17 kg/m²     Physical exam:  Constitutional: well-appearing, no acute distress  Eyes: EOM intact. PERRL bilateral. Not icteric.  Cardiac: normal rate, regular rhythm.  Pulm: normal rate, normal effort.  Skin: normal color and turgor.  Lower extrem: no

## 2024-11-12 NOTE — PROGRESS NOTES
Chief Complaint   Patient presents with    New Patient     New pt from Dr. Velázquez.  Pt is doing great and really just needs refill so on blood pressure medication. [facility] is pretty good.

## 2025-03-04 ENCOUNTER — TELEPHONE (OUTPATIENT)
Facility: CLINIC | Age: 70
End: 2025-03-04

## 2025-03-04 DIAGNOSIS — E78.49 OTHER HYPERLIPIDEMIA: ICD-10-CM

## 2025-03-04 DIAGNOSIS — E55.9 VITAMIN D DEFICIENCY: ICD-10-CM

## 2025-03-04 DIAGNOSIS — I10 PRIMARY HYPERTENSION: ICD-10-CM

## 2025-03-04 LAB
CREAT UR-MCNC: 39.8 MG/DL
MICROALBUMIN UR-MCNC: 0.65 MG/DL
MICROALBUMIN/CREAT UR-RTO: 16 MG/G (ref 0–30)

## 2025-03-04 NOTE — TELEPHONE ENCOUNTER
Lab orders placed. Today    Please call pt, and ask that female have labs drawn prior to scheduled appt, so results can be discussed at that time.

## 2025-03-05 LAB
25(OH)D3 SERPL-MCNC: 49.5 NG/ML (ref 30–100)
ALBUMIN SERPL-MCNC: 3.9 G/DL (ref 3.5–5)
ALBUMIN/GLOB SERPL: 1.1 (ref 1.1–2.2)
ALP SERPL-CCNC: 91 U/L (ref 45–117)
ALT SERPL-CCNC: 38 U/L (ref 12–78)
ANION GAP SERPL CALC-SCNC: 6 MMOL/L (ref 2–12)
AST SERPL-CCNC: 34 U/L (ref 15–37)
BILIRUB SERPL-MCNC: 0.7 MG/DL (ref 0.2–1)
BUN SERPL-MCNC: 7 MG/DL (ref 6–20)
BUN/CREAT SERPL: 14 (ref 12–20)
CALCIUM SERPL-MCNC: 9.8 MG/DL (ref 8.5–10.1)
CHLORIDE SERPL-SCNC: 100 MMOL/L (ref 97–108)
CO2 SERPL-SCNC: 28 MMOL/L (ref 21–32)
CREAT SERPL-MCNC: 0.49 MG/DL (ref 0.55–1.02)
ERYTHROCYTE [DISTWIDTH] IN BLOOD BY AUTOMATED COUNT: 13.7 % (ref 11.5–14.5)
GLOBULIN SER CALC-MCNC: 3.7 G/DL (ref 2–4)
GLUCOSE SERPL-MCNC: 91 MG/DL (ref 65–100)
HCT VFR BLD AUTO: 31.9 % (ref 35–47)
HGB BLD-MCNC: 10.6 G/DL (ref 11.5–16)
MCH RBC QN AUTO: 30.8 PG (ref 26–34)
MCHC RBC AUTO-ENTMCNC: 33.2 G/DL (ref 30–36.5)
MCV RBC AUTO: 92.7 FL (ref 80–99)
NRBC # BLD: 0 K/UL (ref 0–0.01)
NRBC BLD-RTO: 0 PER 100 WBC
PLATELET # BLD AUTO: 342 K/UL (ref 150–400)
PMV BLD AUTO: 10.8 FL (ref 8.9–12.9)
POTASSIUM SERPL-SCNC: 4.2 MMOL/L (ref 3.5–5.1)
PROT SERPL-MCNC: 7.6 G/DL (ref 6.4–8.2)
RBC # BLD AUTO: 3.44 M/UL (ref 3.8–5.2)
SODIUM SERPL-SCNC: 134 MMOL/L (ref 136–145)
SPECIMEN HOLD: NORMAL
WBC # BLD AUTO: 3.6 K/UL (ref 3.6–11)

## 2025-03-05 SDOH — HEALTH STABILITY: PHYSICAL HEALTH: ON AVERAGE, HOW MANY MINUTES DO YOU ENGAGE IN EXERCISE AT THIS LEVEL?: 20 MIN

## 2025-03-05 SDOH — HEALTH STABILITY: PHYSICAL HEALTH: ON AVERAGE, HOW MANY DAYS PER WEEK DO YOU ENGAGE IN MODERATE TO STRENUOUS EXERCISE (LIKE A BRISK WALK)?: 3 DAYS

## 2025-03-05 ASSESSMENT — LIFESTYLE VARIABLES
HOW MANY STANDARD DRINKS CONTAINING ALCOHOL DO YOU HAVE ON A TYPICAL DAY: 1 OR 2
HOW OFTEN DO YOU HAVE A DRINK CONTAINING ALCOHOL: MONTHLY OR LESS
HOW MANY STANDARD DRINKS CONTAINING ALCOHOL DO YOU HAVE ON A TYPICAL DAY: 1
HOW OFTEN DO YOU HAVE A DRINK CONTAINING ALCOHOL: 2
HOW OFTEN DO YOU HAVE SIX OR MORE DRINKS ON ONE OCCASION: 1

## 2025-03-05 ASSESSMENT — PATIENT HEALTH QUESTIONNAIRE - PHQ9
1. LITTLE INTEREST OR PLEASURE IN DOING THINGS: NOT AT ALL
SUM OF ALL RESPONSES TO PHQ QUESTIONS 1-9: 0
2. FEELING DOWN, DEPRESSED OR HOPELESS: NOT AT ALL

## 2025-03-06 LAB
CHOLEST SERPL-MCNC: 166 MG/DL (ref 100–199)
HDL SERPL-SCNC: 29.3 UMOL/L
HDLC SERPL-MCNC: 58 MG/DL
LDL SERPL QN: 21.2 NM
LDL SERPL-SCNC: 1015 NMOL/L
LDL SMALL SERPL-SCNC: 318 NMOL/L
LDLC SERPL CALC-MCNC: 92 MG/DL (ref 0–99)
LP-IR SCORE SERPL: <25
LPA SERPL-SCNC: 10.3 NMOL/L
TRIGL SERPL-MCNC: 88 MG/DL (ref 0–149)
TSH SERPL DL<=0.05 MIU/L-ACNC: 2.5 UIU/ML (ref 0.45–4.5)

## 2025-03-12 SDOH — ECONOMIC STABILITY: FOOD INSECURITY: WITHIN THE PAST 12 MONTHS, YOU WORRIED THAT YOUR FOOD WOULD RUN OUT BEFORE YOU GOT MONEY TO BUY MORE.: NEVER TRUE

## 2025-03-12 SDOH — ECONOMIC STABILITY: FOOD INSECURITY: WITHIN THE PAST 12 MONTHS, THE FOOD YOU BOUGHT JUST DIDN'T LAST AND YOU DIDN'T HAVE MONEY TO GET MORE.: NEVER TRUE

## 2025-03-12 SDOH — ECONOMIC STABILITY: INCOME INSECURITY: IN THE LAST 12 MONTHS, WAS THERE A TIME WHEN YOU WERE NOT ABLE TO PAY THE MORTGAGE OR RENT ON TIME?: NO

## 2025-03-12 SDOH — ECONOMIC STABILITY: TRANSPORTATION INSECURITY
IN THE PAST 12 MONTHS, HAS THE LACK OF TRANSPORTATION KEPT YOU FROM MEDICAL APPOINTMENTS OR FROM GETTING MEDICATIONS?: NO

## 2025-03-13 ENCOUNTER — OFFICE VISIT (OUTPATIENT)
Facility: CLINIC | Age: 70
End: 2025-03-13

## 2025-03-13 VITALS
TEMPERATURE: 97.3 F | DIASTOLIC BLOOD PRESSURE: 79 MMHG | WEIGHT: 135 LBS | SYSTOLIC BLOOD PRESSURE: 137 MMHG | RESPIRATION RATE: 16 BRPM | BODY MASS INDEX: 23.05 KG/M2 | OXYGEN SATURATION: 99 % | HEART RATE: 78 BPM | HEIGHT: 64 IN

## 2025-03-13 DIAGNOSIS — E78.2 MIXED HYPERLIPIDEMIA: ICD-10-CM

## 2025-03-13 DIAGNOSIS — Z00.00 MEDICARE ANNUAL WELLNESS VISIT, SUBSEQUENT: Primary | ICD-10-CM

## 2025-03-13 DIAGNOSIS — I10 PRIMARY HYPERTENSION: ICD-10-CM

## 2025-03-13 DIAGNOSIS — K51.30 ULCERATIVE (CHRONIC) RECTOSIGMOIDITIS WITHOUT COMPLICATIONS (HCC): ICD-10-CM

## 2025-03-13 DIAGNOSIS — D63.8 ANEMIA OF CHRONIC DISEASE: ICD-10-CM

## 2025-03-13 DIAGNOSIS — E55.9 VITAMIN D DEFICIENCY: ICD-10-CM

## 2025-03-13 SDOH — ECONOMIC STABILITY: FOOD INSECURITY: WITHIN THE PAST 12 MONTHS, THE FOOD YOU BOUGHT JUST DIDN'T LAST AND YOU DIDN'T HAVE MONEY TO GET MORE.: NEVER TRUE

## 2025-03-13 SDOH — ECONOMIC STABILITY: FOOD INSECURITY: WITHIN THE PAST 12 MONTHS, YOU WORRIED THAT YOUR FOOD WOULD RUN OUT BEFORE YOU GOT MONEY TO BUY MORE.: NEVER TRUE

## 2025-03-13 NOTE — PROGRESS NOTES
Medicare Annual Wellness Visit    Layne Mcghee is here for Medicare AWV (AWV things going pretty well.  Check on bp and medication.  )    Assessment & Plan  1. Hypertension: Stable. Sodium 134. Normal heart size on 2022 chest CT. No recent baseline EKG.  - Continue hydrochlorothiazide 12.5 mg and losartan 50 mg.  - Advised to bring home BP monitor for calibration.  - Encouraged to maintain a diet rich in fruits and vegetables.  - Discussed potential benefits of magnesium supplementation.  - Informed about EKG testing availability.    2. Ulcerative Colitis:   - Continue current management with sulfasalazine.  - Advised to stay updated on research.    3. Hyponatremia:   - Advised to monitor sodium intake and consider natural salts.    4. Normocytic Anemia:   - Likely due to colitis.  - Advised to avoid iron supplements.    5. Hyperlipidemia:   - Encouraged to continue anti-inflammatory diet and lifestyle.    6. Vitamin D Deficiency:   - Advised to continue current supplementation.    7. Peripheral Vascular Disease:   - Diagnosis will be removed from chart.    Follow-up  - Follow up in 1 year.    PROCEDURE  Procedure Performed  Skin grafts    Technique  Advised to use a wound VAC following ankle and buttocks injury.    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

## 2025-03-13 NOTE — PATIENT INSTRUCTIONS
Magnesium glycinate 400mg nightly.    --------------------------------------------------------------------------------------------------------     Hearing Loss: Care Instructions  Overview     Hearing loss is a sudden or slow decrease in how well you hear. It can range from slight to profound. Permanent hearing loss can occur with aging. It also can happen when you are exposed long-term to loud noise. Examples include listening to loud music, riding motorcycles, or being around other loud machines.  Hearing loss can affect your work and home life. It can make you feel lonely or depressed. You may feel that you have lost your independence. But hearing aids and other devices can help you hear better and feel connected to others.  Follow-up care is a key part of your treatment and safety. Be sure to make and go to all appointments, and call your doctor if you are having problems. It's also a good idea to know your test results and keep a list of the medicines you take.  How can you care for yourself at home?  Avoid loud noises whenever possible. This helps keep your hearing from getting worse.  Always wear hearing protection around loud noises.  Wear a hearing aid as directed.  A professional can help you pick a hearing aid that will work best for you.  You can also get hearing aids over the counter for mild to moderate hearing loss.  Have hearing tests as your doctor suggests. They can show whether your hearing has changed. Your hearing aid may need to be adjusted.  Use other devices as needed. These may include:  Telephone amplifiers and hearing aids that can connect to a television, stereo, radio, or microphone.  Devices that use lights or vibrations. These alert you to the doorbell, a ringing telephone, or a baby monitor.  Television closed-captioning. This shows the words at the bottom of the screen. Most new TVs can do this.  TTY (text telephone). This lets you type messages back and forth on the telephone

## 2025-03-13 NOTE — PROGRESS NOTES
Chief Complaint   Patient presents with    Medicare AWV     AWV things going pretty well.  Check on bp and medication.

## (undated) DEVICE — DRAPE,U/ SHT,SPLIT,PLAS,STERIL: Brand: MEDLINE

## (undated) DEVICE — HAND-SFMCASU: Brand: MEDLINE INDUSTRIES, INC.

## (undated) DEVICE — Z DISCONTINUED USE 2751540 TUBING IRRIG L10IN DISP PMP ENDOGATOR

## (undated) DEVICE — SUTURE VICRYL + SZ 3-0 L27IN ABSRB UD L26MM SH 1/2 CIR VCP416H

## (undated) DEVICE — SOLUTION IRRIG 1000ML STRL H2O USP PLAS POUR BTL

## (undated) DEVICE — GLOVE ORANGE PI 7   MSG9070

## (undated) DEVICE — Device: Brand: MEDICAL ACTION INDUSTRIES

## (undated) DEVICE — NEONATAL-ADULT SPO2 SENSOR: Brand: NELLCOR

## (undated) DEVICE — CUFF BLD PRSS AD SM SZ 10 FOR 20-26CM LIMB VLY SFT W/O TUBE

## (undated) DEVICE — GLOVE SURG SZ 85 L12IN FNGR THK79MIL GRN LTX FREE

## (undated) DEVICE — GLOVE SURG SZ 8 CRM LTX FREE POLYISOPRENE POLYMER BEAD ANTI

## (undated) DEVICE — FCPS RAD JAW 4LC 240CM W/NDL -- BX/40

## (undated) DEVICE — ZIMMER® STERILE DISPOSABLE TOURNIQUET CUFF WITH PROTECTIVE SLEEVE AND PLC, DUAL PORT, SINGLE BLADDER, 18 IN. (46 CM)

## (undated) DEVICE — AIRLIFE™ U/CONNECT-IT OXYGEN TUBING 7 FEET (2.1 M) CRUSH-RESISTANT OXYGEN TUBING, VINYL TIPPED: Brand: AIRLIFE™

## (undated) DEVICE — 1200 GUARD II KIT W/5MM TUBE W/O VAC TUBE: Brand: GUARDIAN

## (undated) DEVICE — BAG BELONG PT PERS CLEAR HANDL

## (undated) DEVICE — CATH IV AUTOGRD BC BLU 22GA 25 -- INSYTE

## (undated) DEVICE — KENDALL RADIOLUCENT FOAM MONITORING ELECTRODE -RECTANGULAR SHAPE: Brand: KENDALL

## (undated) DEVICE — SYRINGE MED 20ML STD CLR PLAS LUERLOCK TIP N CTRL DISP

## (undated) DEVICE — FORCEPS BX L240CM JAW DIA2.8MM L CAP W/ NDL MIC MESH TOOTH

## (undated) DEVICE — QUILTED PREMIUM COMFORT UNDERPAD,EXTRA HEAVY: Brand: WINGS

## (undated) DEVICE — NEEDLE HYPO 18GA L1.5IN PNK S STL HUB POLYPR SHLD REG BVL

## (undated) DEVICE — TOWEL,OR,DSP,ST,BLUE,STD,2/PK,40PK/CS: Brand: MEDLINE

## (undated) DEVICE — CONNECTOR TBNG AUX H2O JET DISP FOR OLY 160/180 SER

## (undated) DEVICE — SOLUTION IRRIG 500ML 0.9% SOD CHLO USP POUR PLAS BTL

## (undated) DEVICE — SNARE VASC L240CM LOOP W10MM SHTH DIA2.4MM RND STIFF CLD

## (undated) DEVICE — SUTURE MONOCRYL SZ 3-0 L27IN ABSRB UD L19MM PS-2 3/8 CIR PRIM Y427H

## (undated) DEVICE — GOWN,SIRUS,NONRNF,SETINSLV,2XL,18/CS: Brand: MEDLINE

## (undated) DEVICE — TRAP SURG QUAD PARABOLA SLOT DSGN SFTY SCRN TRAPEASE

## (undated) DEVICE — STRIP,CLOSURE,WOUND,MEDI-STRIP,1/2X4: Brand: MEDLINE

## (undated) DEVICE — CONTAINER SPEC 20 ML LID NEUT BUFF FORMALIN 10 % POLYPR STS

## (undated) DEVICE — BANDAGE COMPR W3INXL5YD WHT BGE POLY COT M E WRP WV HK AND

## (undated) DEVICE — Z DISCONTINUED NO SUB IDED SET EXTN W/ 4 W STPCOCK M SPIN LOK 36IN

## (undated) DEVICE — BW-412T DISP COMBO CLEANING BRUSH: Brand: SINGLE USE COMBINATION CLEANING BRUSH

## (undated) DEVICE — ENDO CARRY-ON PROCEDURE KIT INCLUDES ENZYMATIC SPONGE, GAUZE, BIOHAZARD LABEL, TRAY, LUBRICANT, DIRTY SCOPE LABEL, WATER LABEL, TRAY, DRAWSTRING PAD, AND DEFENDO 4-PIECE KIT.: Brand: ENDO CARRY-ON PROCEDURE KIT

## (undated) DEVICE — GLOVE SURG SZ 75 L12IN FNGR THK79MIL GRN LTX FREE

## (undated) DEVICE — BAG SPEC BIOHZD LF 2MIL 6X10IN -- CONVERT TO ITEM 357326

## (undated) DEVICE — SOLIDIFIER FLUID 3000 CC ABSORB

## (undated) DEVICE — SET ADMIN 16ML TBNG L100IN 2 Y INJ SITE IV PIGGY BK DISP